# Patient Record
Sex: FEMALE | Race: WHITE | Employment: OTHER | URBAN - METROPOLITAN AREA
[De-identification: names, ages, dates, MRNs, and addresses within clinical notes are randomized per-mention and may not be internally consistent; named-entity substitution may affect disease eponyms.]

---

## 2018-06-24 ENCOUNTER — HOSPITAL ENCOUNTER (EMERGENCY)
Age: 78
Discharge: HOME | End: 2018-06-24
Attending: EMERGENCY MEDICINE
Payer: MEDICARE

## 2018-06-24 VITALS
WEIGHT: 152.34 LBS | OXYGEN SATURATION: 98 % | RESPIRATION RATE: 16 BRPM | HEIGHT: 61 IN | HEART RATE: 62 BPM | DIASTOLIC BLOOD PRESSURE: 76 MMHG | SYSTOLIC BLOOD PRESSURE: 160 MMHG | TEMPERATURE: 98.1 F | BODY MASS INDEX: 28.76 KG/M2

## 2018-06-24 DIAGNOSIS — S81.812A LACERATION OF LEFT LOWER EXTREMITY, INITIAL ENCOUNTER: Primary | ICD-10-CM

## 2018-06-24 PROCEDURE — 90715 TDAP VACCINE 7 YRS/> IM: CPT | Performed by: NURSE PRACTITIONER

## 2018-06-24 PROCEDURE — 74011000250 HC RX REV CODE- 250: Performed by: NURSE PRACTITIONER

## 2018-06-24 PROCEDURE — 74011250636 HC RX REV CODE- 250/636: Performed by: NURSE PRACTITIONER

## 2018-06-24 PROCEDURE — 75810000293 HC SIMP/SUPERF WND  RPR

## 2018-06-24 PROCEDURE — 90471 IMMUNIZATION ADMIN: CPT

## 2018-06-24 PROCEDURE — 77030018836 HC SOL IRR NACL ICUM -A

## 2018-06-24 PROCEDURE — 99282 EMERGENCY DEPT VISIT SF MDM: CPT

## 2018-06-24 PROCEDURE — 77030002986 HC SUT PROL J&J -A

## 2018-06-24 RX ORDER — LIDOCAINE HYDROCHLORIDE 10 MG/ML
10 INJECTION INFILTRATION; PERINEURAL ONCE
Status: COMPLETED | OUTPATIENT
Start: 2018-06-24 | End: 2018-06-24

## 2018-06-24 RX ORDER — LEVOTHYROXINE SODIUM 112 UG/1
112 TABLET ORAL
COMMUNITY

## 2018-06-24 RX ORDER — GUAIFENESIN 100 MG/5ML
81 LIQUID (ML) ORAL DAILY
COMMUNITY
End: 2022-06-08

## 2018-06-24 RX ORDER — CHOLECALCIFEROL (VITAMIN D3) 125 MCG
CAPSULE ORAL
COMMUNITY
End: 2022-06-08

## 2018-06-24 RX ORDER — BACITRACIN 500 UNIT/G
1 PACKET (EA) TOPICAL ONCE
Status: COMPLETED | OUTPATIENT
Start: 2018-06-24 | End: 2018-06-24

## 2018-06-24 RX ADMIN — LIDOCAINE HYDROCHLORIDE 10 ML: 10 INJECTION, SOLUTION INFILTRATION; PERINEURAL at 12:04

## 2018-06-24 RX ADMIN — BACITRACIN 1 PACKET: 500 OINTMENT TOPICAL at 12:04

## 2018-06-24 RX ADMIN — TETANUS TOXOID, REDUCED DIPHTHERIA TOXOID AND ACELLULAR PERTUSSIS VACCINE, ADSORBED 0.5 ML: 5; 2.5; 8; 8; 2.5 SUSPENSION INTRAMUSCULAR at 12:04

## 2018-06-24 NOTE — ED PROVIDER NOTES
HPI Comments: Patient is a 70-year-old female with a past medical history significant for hypercholesterolemia, hypertension, hypothyroid, gastric ulcer who is ambulatory to the ED today with a laceration to her left lateral leg. Patient [place] the [place] putting garbage and knee contain R. when she cut the left lateral side of her lower leg on a wire that was \"sticking up out of the ground\". It began to bleed immediately. Patient states she does bleed easily. Someone from EMS was on scene and was able to apply a pressure dressing. She came to the ED immediately for care of her laceration. Medications and allergies reviewed. Patient has further complaints at this time. Primary care Monika Au MD      The history is provided by the patient. No  was used. Past Medical History:   Diagnosis Date    High cholesterol     Hypertension     Hypothyroid     Stomach ulcer        Past Surgical History:   Procedure Laterality Date    HX HYSTERECTOMY      HX ORTHOPAEDIC           History reviewed. No pertinent family history. Social History     Social History    Marital status: SINGLE     Spouse name: N/A    Number of children: N/A    Years of education: N/A     Occupational History    Not on file. Social History Main Topics    Smoking status: Never Smoker    Smokeless tobacco: Never Used    Alcohol use Yes      Comment: socially    Drug use: No    Sexual activity: Not on file     Other Topics Concern    Not on file     Social History Narrative    No narrative on file         ALLERGIES: Latex and Iodine    Review of Systems   Constitutional: Negative for appetite change, chills and fever. HENT: Negative. Respiratory: Negative for cough, shortness of breath and wheezing. Cardiovascular: Negative for chest pain. Gastrointestinal: Negative for abdominal pain, constipation, diarrhea, nausea and vomiting.    Genitourinary: Negative for dysuria and urgency. Musculoskeletal: Negative for back pain. Skin: Positive for wound. Negative for color change and rash. Neurological: Negative for dizziness and headaches. Psychiatric/Behavioral: Negative. All other systems reviewed and are negative. Vitals:    06/24/18 1152   BP: 190/87   Pulse: 69   Resp: 17   Temp: 98.1 °F (36.7 °C)   SpO2: 98%   Weight: 69.1 kg (152 lb 5.4 oz)   Height: 5' 1\" (1.549 m)            Physical Exam   Constitutional: She appears well-developed and well-nourished. HENT:   Head: Atraumatic. Eyes: EOM are normal.   Neck: No tracheal deviation present. Pulmonary/Chest: Effort normal. No respiratory distress. Musculoskeletal: Normal range of motion. Neurological: She is alert. Skin: Skin is warm and dry. Laceration noted. No rash noted. ~ 3 cm laceration to the left lateral leg. No obvious FB. Minimal depth. Bleeds easily. Photo of closed wound attached at end of note. Psychiatric: She has a normal mood and affect. Her behavior is normal. Judgment and thought content normal.   Nursing note and vitals reviewed. [facility]      ED Course     Assessment & Plan:     Orders Placed This Encounter    lidocaine (XYLOCAINE) 10 mg/mL (1 %) injection 10 mL    bacitracin 500 unit/gram packet 1 Packet    Damari montano(KANE)Felipa Vac-PF (BOOSTRIX) suspension 0.5 mL       Discussed with Celia Parker MD,ED Provider    Aria Palomo NP  06/24/18  12:02 PM      Wound Repair  Date/Time: 6/24/2018 12:30 PM  Performed by: NPPreparation: sterile field established and skin prepped with ChloraPrep  Pre-procedure re-eval: Immediately prior to the procedure, the patient was reevaluated and found suitable for the planned procedure and any planned medications. Time out: Immediately prior to the procedure a time out was called to verify the correct patient, procedure, equipment, staff and marking as appropriate. .  Location details: left leg  Wound length:2.6 - 7.5 cm (~ 3 cm)  Anesthesia: local infiltration    Anesthesia:  Local Anesthetic: lidocaine 1% without epinephrine  Anesthetic total: 8 mL  Foreign bodies: no foreign bodies  Irrigation solution: saline  Irrigation method: tap  Debridement: none  Skin closure: Prolene  Number of sutures: 6  Technique: interrupted  Approximation: close  Dressing: antibiotic ointment and gauze roll (adaptic non-adherent and coban)  Patient tolerance: Patient tolerated the procedure well with no immediate complications  My total time at bedside, performing this procedure was 16-30 minutes. 12:56 PM  The patient has been reevaluated. The patient is ready for discharge. The patient's signs, symptoms, diagnosis, and discharge instructions have been discussed and the patient/ family has conveyed their understanding. The patient is to follow up as recommended or return to the ED should their symptoms worsen. Plan has been discussed and the patient is in agreement. LABORATORY TESTS:  Labs Reviewed - No data to display    IMAGING RESULTS:  No results found. MEDICATIONS GIVEN:  Medications   lidocaine (XYLOCAINE) 10 mg/mL (1 %) injection 10 mL (10 mL IntraDERMal Given by Provider 6/24/18 1204)   bacitracin 500 unit/gram packet 1 Packet (1 Packet Topical Given 6/24/18 1204)   diph,Pertuss(AC),Tet Vac-PF (BOOSTRIX) suspension 0.5 mL (0.5 mL IntraMUSCular Given 6/24/18 1204)       IMPRESSION:  1. Laceration of left lower extremity, initial encounter        PLAN:  1. Current Discharge Medication List      CONTINUE these medications which have NOT CHANGED    Details   levothyroxine (SYNTHROID) 25 mcg tablet Take  by mouth Daily (before breakfast). LOVASTATIN PO Take  by mouth. OTHER       aspirin 81 mg chewable tablet Take 81 mg by mouth daily. naproxen sodium (ALEVE) 220 mg cap Take  by mouth.            2.   Follow-up Information     Follow up With Details Comments Contact Info    Brandyn Sutherland MD  For suture removal in 10-14 days [address] [place]  [facility] EMERGENCY DEPT  As needed, If symptoms worsen [place] [address]  [phone]        3.      Return to ED for new or worsening symptoms       Marie Cabezas NP

## 2018-06-24 NOTE — DISCHARGE INSTRUCTIONS
Cuts: Care Instructions  Your Care Instructions  A cut can happen anywhere on your body. Stitches, staples, skin adhesives, or pieces of tape called Steri-Strips are sometimes used to keep the edges of a cut together and help it heal. Steri-Strips can be used by themselves or with stitches or staples. Sometimes cuts are left open. If the cut went deep and through the skin, the doctor may have closed the cut in two layers. A deeper layer of stitches brings the deep part of the cut together. These stitches will dissolve and don't need to be removed. The upper layer closure, which could be stitches, staples, Steri-Strips, or adhesive, is what you see on the cut. A cut is often covered by a bandage. The doctor has checked you carefully, but problems can develop later. If you notice any problems or new symptoms, get medical treatment right away. Follow-up care is a key part of your treatment and safety. Be sure to make and go to all appointments, and call your doctor if you are having problems. It's also a good idea to know your test results and keep a list of the medicines you take. How can you care for yourself at home? If a cut is open or closed  · Prop up the sore area on a pillow anytime you sit or lie down during the next 3 days. Try to keep it above the level of your heart. This will help reduce swelling. · Keep the cut dry for the first 24 to 48 hours. After this, you can shower if your doctor okays it. Pat the cut dry. · Don't soak the cut, such as in a bathtub. Your doctor will tell you when it's safe to get the cut wet. · After the first 24 to 48 hours, clean the cut with soap and water 2 times a day unless your doctor gives you different instructions. ¨ Don't use hydrogen peroxide or alcohol, which can slow healing. ¨ You may cover the cut with a thin layer of petroleum jelly and a nonstick bandage.   ¨ If the doctor put a bandage over the cut, put on a new bandage after cleaning the cut or if the bandage gets wet or dirty. · Avoid any activity that could cause your cut to reopen. · Be safe with medicines. Read and follow all instructions on the label. ¨ If the doctor gave you a prescription medicine for pain, take it as prescribed. ¨ If you are not taking a prescription pain medicine, ask your doctor if you can take an over-the-counter medicine. If the cut is closed with stitches, staples, or Steri-Strips  · Follow the above instructions for open or closed cuts. · Do not remove the stitches or staples on your own. Your doctor will tell you when to come back to have the stitches or staples removed. · Leave Steri-Strips on until they fall off. If the cut is closed with a skin adhesive  · Follow the above instructions for open or closed cuts. · Leave the skin adhesive on your skin until it falls off on its own. This may take 5 to 10 days. · Do not scratch, rub, or pick at the adhesive. · Do not put the sticky part of a bandage directly on the adhesive. · Do not put any kind of ointment, cream, or lotion over the area. This can make the adhesive fall off too soon. Do not use hydrogen peroxide or alcohol, which can slow healing. When should you call for help? Call your doctor now or seek immediate medical care if:  ? · You have new pain, or your pain gets worse. ? · The skin near the cut is cold or pale or changes color. ? · You have tingling, weakness, or numbness near the cut.   ? · The cut starts to bleed, and blood soaks through the bandage. Oozing small amounts of blood is normal.   ? · You have trouble moving the area near the cut.   ? · You have symptoms of infection, such as:  ¨ Increased pain, swelling, warmth, or redness around the cut. ¨ Red streaks leading from the cut. ¨ Pus draining from the cut. ¨ A fever. ? Watch closely for changes in your health, and be sure to contact your doctor if:  ? · The cut reopens. ? · You do not get better as expected.    Where can you learn more? Go to http://ravi-adrien.info/. Enter M735 in the search box to learn more about \"Cuts: Care Instructions. \"  Current as of: March 20, 2017  Content Version: 11.4  © 6927-7043 [employer]. Care instructions adapted under license by [employer] (which disclaims liability or warranty for this information). If you have questions about a medical condition or this instruction, always ask your healthcare professional. [address] any warranty or liability for your use of this information.

## 2018-06-24 NOTE — ED TRIAGE NOTES
Patient ambulatory to ED treatment area with steady gait for complaint of \"[place] and I ended up cutting my left leg on a piece of metal sticking out of the ground. \" Event occurred around 9:30am today. Denies taking blood thinning medications. Reports EMS wrapped the area. Last tetanus shot was 7 yrs ago. Patient is alert and oriented. Answering questions appropriately.

## 2018-06-24 NOTE — ED NOTES
Patient discharged home after receiving discharge instructions from MD/NP. Patient voiced understanding and doesn't have any questions at this time. Patient in no distress at this time.  Pt ambulated out of the ER with dressing intact

## 2020-10-12 ENCOUNTER — HOSPITAL ENCOUNTER (EMERGENCY)
Age: 80
Discharge: HOME | End: 2020-10-12
Attending: EMERGENCY MEDICINE
Payer: MEDICARE

## 2020-10-12 VITALS
OXYGEN SATURATION: 97 % | HEART RATE: 82 BPM | TEMPERATURE: 98.2 F | HEIGHT: 62 IN | SYSTOLIC BLOOD PRESSURE: 141 MMHG | WEIGHT: 138 LBS | RESPIRATION RATE: 16 BRPM | BODY MASS INDEX: 25.4 KG/M2 | DIASTOLIC BLOOD PRESSURE: 74 MMHG

## 2020-10-12 DIAGNOSIS — N30.01 ACUTE CYSTITIS WITH HEMATURIA: Primary | ICD-10-CM

## 2020-10-12 LAB
APPEARANCE UR: ABNORMAL
BACTERIA URNS QL MICRO: ABNORMAL /HPF
BILIRUB UR QL: NEGATIVE
COLOR UR: ABNORMAL
EPITH CASTS URNS QL MICRO: ABNORMAL /LPF
GLUCOSE UR STRIP.AUTO-MCNC: NEGATIVE MG/DL
HGB UR QL STRIP: ABNORMAL
KETONES UR QL STRIP.AUTO: NEGATIVE MG/DL
LEUKOCYTE ESTERASE UR QL STRIP.AUTO: ABNORMAL
NITRITE UR QL STRIP.AUTO: NEGATIVE
PH UR STRIP: 6 [PH] (ref 5–8)
PROT UR STRIP-MCNC: 100 MG/DL
RBC #/AREA URNS HPF: ABNORMAL /HPF (ref 0–5)
SP GR UR REFRACTOMETRY: 1.01 (ref 1–1.03)
UR CULT HOLD, URHOLD: NORMAL
UROBILINOGEN UR QL STRIP.AUTO: 0.2 EU/DL (ref 0.2–1)
WBC URNS QL MICRO: >100 /HPF (ref 0–4)

## 2020-10-12 PROCEDURE — 81001 URINALYSIS AUTO W/SCOPE: CPT

## 2020-10-12 PROCEDURE — 99284 EMERGENCY DEPT VISIT MOD MDM: CPT

## 2020-10-12 RX ORDER — FLUCONAZOLE 150 MG/1
150 TABLET ORAL DAILY
Qty: 1 TAB | Refills: 0 | Status: SHIPPED | OUTPATIENT
Start: 2020-10-12 | End: 2020-10-13

## 2020-10-12 RX ORDER — CEPHALEXIN 500 MG/1
500 CAPSULE ORAL 3 TIMES DAILY
Qty: 10 CAP | Refills: 0 | Status: SHIPPED | OUTPATIENT
Start: 2020-10-12 | End: 2022-06-08

## 2020-10-12 RX ORDER — FLUCONAZOLE 100 MG/1
150 TABLET ORAL
Status: DISCONTINUED | OUTPATIENT
Start: 2020-10-12 | End: 2020-10-12 | Stop reason: HOSPADM

## 2020-10-12 RX ORDER — CEPHALEXIN 250 MG/1
500 CAPSULE ORAL
Status: DISCONTINUED | OUTPATIENT
Start: 2020-10-12 | End: 2020-10-12 | Stop reason: HOSPADM

## 2020-10-12 NOTE — ED PROVIDER NOTES
65-year-old female with a history of high cholesterol, hypertension, and hypothyroidism has felt urinary urgency and pressure with dysuria for the last 3 or 4 days. She tried Azo and cranberry juice with no relief. No fever or nausea or vomiting or back pain. She thinks she has a urinary tract infection, but she has never had one. Past Medical History:   Diagnosis Date    High cholesterol     Hypertension     Hypothyroid     Stomach ulcer        Past Surgical History:   Procedure Laterality Date    HX HYSTERECTOMY      HX ORTHOPAEDIC      right ankle         History reviewed. No pertinent family history.     Social History     Socioeconomic History    Marital status:      Spouse name: Not on file    Number of children: Not on file    Years of education: Not on file    Highest education level: Not on file   Occupational History    Not on file   Social Needs    Financial resource strain: Not on file    Food insecurity     Worry: Not on file     Inability: Not on file    Transportation needs     Medical: Not on file     Non-medical: Not on file   Tobacco Use    Smoking status: Never Smoker    Smokeless tobacco: Never Used   Substance and Sexual Activity    Alcohol use: Yes     Comment: socially    Drug use: No    Sexual activity: Not on file   Lifestyle    Physical activity     Days per week: Not on file     Minutes per session: Not on file    Stress: Not on file   Relationships    Social connections     Talks on phone: Not on file     Gets together: Not on file     Attends Mosque service: Not on file     Active member of club or organization: Not on file     Attends meetings of clubs or organizations: Not on file     Relationship status: Not on file    Intimate partner violence     Fear of current or ex partner: Not on file     Emotionally abused: Not on file     Physically abused: Not on file     Forced sexual activity: Not on file   Other Topics Concern    Not on file Social History Narrative    Not on file         ALLERGIES: Latex and Iodine    Review of Systems   Constitutional: Negative for fever. HENT: Negative for trouble swallowing. Eyes: Negative for visual disturbance. Respiratory: Negative for cough. Cardiovascular: Negative for chest pain. Gastrointestinal: Negative for abdominal pain. Genitourinary: Positive for difficulty urinating, dysuria and urgency. Musculoskeletal: Negative for gait problem. Skin: Negative for rash. Neurological: Negative for headaches. Hematological: Does not bruise/bleed easily. Psychiatric/Behavioral: Negative for sleep disturbance. Vitals:    10/12/20 1526   BP: (!) 158/65   Pulse: 82   Resp: 16   Temp: 98.2 °F (36.8 °C)   SpO2: 97%   Weight: 62.6 kg (138 lb)   Height: 5' 2\" (1.575 m)            Physical Exam  Constitutional:       Appearance: Normal appearance. HENT:      Head: Normocephalic. Nose: Nose normal.      Mouth/Throat:      Mouth: Mucous membranes are moist.   Eyes:      Extraocular Movements: Extraocular movements intact. Conjunctiva/sclera: Conjunctivae normal.   Cardiovascular:      Rate and Rhythm: Normal rate. Pulmonary:      Effort: Pulmonary effort is normal. No respiratory distress. Abdominal:      General: Abdomen is flat. Palpations: Abdomen is soft. Tenderness: There is no abdominal tenderness. Musculoskeletal: Normal range of motion. Skin:     Findings: No rash. Neurological:      General: No focal deficit present. Mental Status: She is alert.    Psychiatric:         Behavior: Behavior normal.          MDM  Number of Diagnoses or Management Options  Acute cystitis with hematuria:   Diagnosis management comments: Cystitis symptoms and urine with significant pyuria is almost certainly acute cystitis  Plan to treat with Keflex and to give some fluconazole to prevent a yeast infection  Follow-up with her doctor unless she feels worse, then she could come back to the emergency department.          Procedures

## 2020-10-12 NOTE — ED NOTES
Patient left prior to receiving medication here prior to discharge, but has prescriptions for at home. MD aware. No further action needed. The patient was discharged home by provider in stable condition. The patient is alert and oriented, in no respiratory distress and discharge vital signs obtained. The patient's diagnosis, condition and treatment were explained. The patient expressed understanding. Two prescriptions given. No work/school note given. A discharge plan has been developed. A  was not involved in the process. Aftercare instructions were given. Pt ambulatory out of the ED.

## 2020-10-12 NOTE — ED TRIAGE NOTES
Patient presents ambulatory to treatment area with a steady gait. Patient complains of urinary urgency, frequency, and pain that began Saturday. Patient has been taking Azo with minimal relief. Denies fever.

## 2020-10-12 NOTE — DISCHARGE INSTRUCTIONS

## 2022-06-08 ENCOUNTER — TRANSCRIBE ORDER (OUTPATIENT)
Dept: SCHEDULING | Age: 82
End: 2022-06-08

## 2022-06-08 ENCOUNTER — HOSPITAL ENCOUNTER (EMERGENCY)
Age: 82
Discharge: HOME | End: 2022-06-08
Attending: STUDENT IN AN ORGANIZED HEALTH CARE EDUCATION/TRAINING PROGRAM
Payer: MEDICARE

## 2022-06-08 VITALS
BODY MASS INDEX: 28.18 KG/M2 | HEIGHT: 61 IN | DIASTOLIC BLOOD PRESSURE: 76 MMHG | OXYGEN SATURATION: 96 % | SYSTOLIC BLOOD PRESSURE: 139 MMHG | RESPIRATION RATE: 18 BRPM | TEMPERATURE: 97 F | HEART RATE: 90 BPM | WEIGHT: 149.25 LBS

## 2022-06-08 DIAGNOSIS — M54.17 LUMBOSACRAL RADICULOPATHY: Primary | ICD-10-CM

## 2022-06-08 DIAGNOSIS — M54.32 LEFT SIDED SCIATICA: Primary | ICD-10-CM

## 2022-06-08 PROCEDURE — 74011250637 HC RX REV CODE- 250/637: Performed by: STUDENT IN AN ORGANIZED HEALTH CARE EDUCATION/TRAINING PROGRAM

## 2022-06-08 PROCEDURE — 99283 EMERGENCY DEPT VISIT LOW MDM: CPT

## 2022-06-08 RX ORDER — OXYCODONE AND ACETAMINOPHEN 5; 325 MG/1; MG/1
1 TABLET ORAL
Qty: 20 TABLET | Refills: 0 | Status: SHIPPED | OUTPATIENT
Start: 2022-06-08 | End: 2022-06-13

## 2022-06-08 RX ORDER — TRAMADOL HYDROCHLORIDE 50 MG/1
50 TABLET ORAL
COMMUNITY
End: 2022-06-08 | Stop reason: ALTCHOICE

## 2022-06-08 RX ORDER — SERTRALINE HYDROCHLORIDE 100 MG/1
100 TABLET, FILM COATED ORAL EVERY EVENING
COMMUNITY

## 2022-06-08 RX ORDER — OXYCODONE AND ACETAMINOPHEN 5; 325 MG/1; MG/1
1 TABLET ORAL ONCE
Status: COMPLETED | OUTPATIENT
Start: 2022-06-08 | End: 2022-06-08

## 2022-06-08 RX ADMIN — OXYCODONE HYDROCHLORIDE AND ACETAMINOPHEN 1 TABLET: 5; 325 TABLET ORAL at 11:44

## 2022-06-08 NOTE — ED NOTES
Pt was discharged and given instructions by Dr Elbert North . Pt verbalized good understanding of all discharge instructions,prescriptions and F/U care. All questions answered. Pt in stable condition on discharge. Pt accompanied home by her niece.

## 2022-06-08 NOTE — ED PROVIDER NOTES
Soumya Dai is a [de-identified] y.o. female with past medical history notable for high cholesterol, hypertension, hypothyroidism, stomach ulcer in the past, presenting with back pain. She states that she has had gradually progressive left buttock and low back pain radiating down her leg over the past several months, gradually worsening. She states that it feels like the leg is giving out at times during paroxysms of severe pain. She has not had fevers chills. Did not have any preceding injury. Denies dysuria. States that she was seen by her primary care doctor last week and she was given a steroid and tramadol. The steroid seems not to be helping very much. Tramadol is only moderately effective. She has not had spinal surgery or injection in the past.  She states that her primary care physician has not been contactable over the last 9 days and she is concerned that she was supposed to be scheduled for an appointment with a specialist but has not heard back. She tried a muscle relaxant without much improvement. She has continued to be ambulatory. Past Medical History:   Diagnosis Date    High cholesterol     Hypertension     Hypothyroid     Stomach ulcer        Past Surgical History:   Procedure Laterality Date    HX HYSTERECTOMY      HX ORTHOPAEDIC      right ankle         History reviewed. No pertinent family history.     Social History     Socioeconomic History    Marital status:      Spouse name: Not on file    Number of children: Not on file    Years of education: Not on file    Highest education level: Not on file   Occupational History    Not on file   Tobacco Use    Smoking status: Never Smoker    Smokeless tobacco: Never Used   Substance and Sexual Activity    Alcohol use: Yes     Comment: socially    Drug use: No    Sexual activity: Not on file   Other Topics Concern    Not on file   Social History Narrative    Not on file     Social Determinants of Health     Financial Resource Strain:     Difficulty of Paying Living Expenses: Not on file   Food Insecurity:     Worried About Running Out of Food in the Last Year: Not on file    Kim of Food in the Last Year: Not on file   Transportation Needs:     Lack of Transportation (Medical): Not on file    Lack of Transportation (Non-Medical): Not on file   Physical Activity:     Days of Exercise per Week: Not on file    Minutes of Exercise per Session: Not on file   Stress:     Feeling of Stress : Not on file   Social Connections:     Frequency of Communication with Friends and Family: Not on file    Frequency of Social Gatherings with Friends and Family: Not on file    Attends Roman Catholic Services: Not on file    Active Member of [address] [place] [address] or Organizations: Not on file    Attends Club or Organization Meetings: Not on file    Marital Status: Not on file   Intimate Partner Violence:     Fear of Current or Ex-Partner: Not on file    Emotionally Abused: Not on file    Physically Abused: Not on file    Sexually Abused: Not on file   Housing Stability:     Unable to Pay for Housing in the Last Year: Not on file    Number of [place] in the Last Year: Not on file    Unstable Housing in the Last Year: Not on file         ALLERGIES: Latex and Iodine    Review of Systems   Constitutional: Negative for chills and fever. Cardiovascular: Negative for chest pain. Gastrointestinal: Negative for abdominal pain, nausea and vomiting. Genitourinary: Negative for dysuria. Musculoskeletal: Positive for back pain. Neurological: Negative for light-headedness. All other systems reviewed and are negative. Vitals:    06/08/22 1102   BP: (!) 160/84   Pulse: 90   Resp: 18   Temp: 97 °F (36.1 °C)   SpO2: 97%   Weight: 67.7 kg (149 lb 4 oz)   Height: 5' 1\" (1.549 m)            Physical Exam  Constitutional:       General: She is not in acute distress. Appearance: She is not toxic-appearing.    HENT:      Head: Normocephalic and atraumatic. Mouth/Throat:      Mouth: Mucous membranes are moist.   Eyes:      Extraocular Movements: Extraocular movements intact. Cardiovascular:      Rate and Rhythm: Normal rate and regular rhythm. Heart sounds: Normal heart sounds. Pulmonary:      Effort: Pulmonary effort is normal. No respiratory distress. Breath sounds: Normal breath sounds. Abdominal:      Palpations: Abdomen is soft. Tenderness: There is no abdominal tenderness. Musculoskeletal:      Cervical back: Normal range of motion. Right lower leg: No edema. Left lower leg: No edema. Comments:   5/5 flexion/extension  hips bilateral  5/5 knee flexion/extension bilateral   5/5 foot flexion/extension bilateral   5/5 EHL/FHL bilateral   Straight leg raise positive left  No saddle anesthesia present. Skin:     Capillary Refill: Capillary refill takes less than 2 seconds. Neurological:      General: No focal deficit present. Mental Status: She is alert and oriented to person, place, and time. Psychiatric:         Mood and Affect: Mood normal.          [facility]  ED Course as of 06/08/22 1153   Wed Jun 08, 2022   1122 I attempted twice to call her primary care physician, no response after waiting for several minutes. [NS]   1489 D/w Dr. Kelley Gale -- XR 9mm anterolisthesis, degerative changes in the hip. [NS]      ED Course User Index  [NS] Tanja Barajas MD       Procedures      MEDICAL DECISION MAKING:  [de-identified] y.o. female presents with Back Pain and Leg Pain    Differential diagnosis includes but not limited to: Lumbar radiculopathy, spinal stenosis. Less likely spinal epidural compression from an infection or hematoma. She is not on anticoagulants. She has not had any trauma. She is already had an x-ray but is just unaware of the result. I attempted to contact her primary care and was also unsuccessful. We can try more potent pain medication.   Will refer to Dr. Wendi Clements TESTS:  Labs Reviewed - No data to display    IMAGING RESULTS:  No orders to display       MEDICATIONS GIVEN:  Medications   oxyCODONE-acetaminophen (PERCOCET) 5-325 mg per tablet 1 Tablet (1 Tablet Oral Given 6/8/22 1144)       PROGRESS NOTE: \11:52 AM Patient has remained stable and is ready for discharge    EKG:  none completed    CONSULTS:  PCP:  11:53 AM we will schedule an MRI, patient needs to make a Ortho follow-up. Give another referral as well. Discussed with family at bedside    IMPRESSION:  1. Lumbosacral radiculopathy        PLAN:  -   Discharge  Current Discharge Medication List      START taking these medications    Details   oxyCODONE-acetaminophen (Percocet) 5-325 mg per tablet Take 1 Tablet by mouth every six (6) hours as needed for Pain for up to 5 days. Max Daily Amount: 4 Tablets. Qty: 20 Tablet, Refills: 0  Start date: 6/8/2022, End date: 6/13/2022    Associated Diagnoses: Lumbosacral radiculopathy           Follow-up Information     Follow up With Specialties Details Why Contact Info    Argentina Mann MD Orthopedic Surgery Schedule an appointment as soon as possible for a visit  Call for a follow up appointment. [address]      Pedro Ca MD Orthopedic Surgery Schedule an appointment as soon as possible for a visit  Call for a follow up appointment. [address] [facility] [address] [phone]          Return precautions given      Karthik Nugent MD      Please note that this dictation was completed with amiando, the computer voice recognition software. Quite often unanticipated grammatical, syntax, homophones, and other interpretive errors are inadvertently transcribed by the computer software. Please disregard these errors. Please excuse any errors that have escaped final proofreading.

## 2022-06-08 NOTE — ED TRIAGE NOTES
Pt ambulated to the treatment area with a slight limp accompanied by her neice. Pt states \"about a month ago I started having left lower back pain that radiates all the way to my foot. I have been to my doctor she did an xray 8 days ago and was supposed to set me up for an MRI but I have not received results and they have not set up the MRI. My left leg and back have extreme pain all the time. My left gave out on me a few times I cant lift my leg to get up steps I cant sleep because of the pain I have Tramadol and just had a steroid pack and it has not helped. \" Dr Rigoberto Hoyos in room.

## 2022-06-17 ENCOUNTER — HOSPITAL ENCOUNTER (OUTPATIENT)
Dept: MRI IMAGING | Age: 82
Discharge: HOME | End: 2022-06-17
Attending: FAMILY MEDICINE
Payer: MEDICARE

## 2022-06-17 DIAGNOSIS — M54.32 LEFT SIDED SCIATICA: ICD-10-CM

## 2022-06-17 PROCEDURE — 72148 MRI LUMBAR SPINE W/O DYE: CPT

## 2022-06-24 ENCOUNTER — TRANSCRIBE ORDER (OUTPATIENT)
Dept: SCHEDULING | Age: 82
End: 2022-06-24

## 2022-06-24 DIAGNOSIS — N28.9 RENAL LESION: Primary | ICD-10-CM

## 2022-06-27 ENCOUNTER — HOSPITAL ENCOUNTER (OUTPATIENT)
Dept: CT IMAGING | Age: 82
Discharge: HOME | End: 2022-06-27
Attending: ORTHOPAEDIC SURGERY

## 2022-06-27 DIAGNOSIS — N28.9 RENAL LESION: ICD-10-CM

## 2022-06-30 ENCOUNTER — TRANSCRIBE ORDER (OUTPATIENT)
Dept: SCHEDULING | Age: 82
End: 2022-06-30

## 2022-06-30 DIAGNOSIS — N28.9 KIDNEY LESION: Primary | ICD-10-CM

## 2022-07-01 ENCOUNTER — HOSPITAL ENCOUNTER (OUTPATIENT)
Dept: MRI IMAGING | Age: 82
Discharge: HOME | End: 2022-07-01
Attending: ORTHOPAEDIC SURGERY
Payer: MEDICARE

## 2022-07-01 VITALS — BODY MASS INDEX: 27.21 KG/M2 | WEIGHT: 144 LBS

## 2022-07-01 DIAGNOSIS — N28.9 KIDNEY LESION: ICD-10-CM

## 2022-07-01 PROCEDURE — 77030021566

## 2022-07-01 PROCEDURE — 74011250636 HC RX REV CODE- 250/636: Performed by: ORTHOPAEDIC SURGERY

## 2022-07-01 PROCEDURE — A9575 INJ GADOTERATE MEGLUMI 0.1ML: HCPCS | Performed by: ORTHOPAEDIC SURGERY

## 2022-07-01 PROCEDURE — 74183 MRI ABD W/O CNTR FLWD CNTR: CPT

## 2022-07-01 RX ORDER — GADOTERATE MEGLUMINE 376.9 MG/ML
13 INJECTION INTRAVENOUS
Status: COMPLETED | OUTPATIENT
Start: 2022-07-01 | End: 2022-07-01

## 2022-07-01 RX ADMIN — GADOTERATE MEGLUMINE 13 ML: 376.9 INJECTION INTRAVENOUS at 12:35

## 2022-07-14 ENCOUNTER — HOSPITAL ENCOUNTER (OUTPATIENT)
Dept: PREADMISSION TESTING | Age: 82
Discharge: HOME | End: 2022-07-14
Payer: MEDICARE

## 2022-07-14 VITALS
SYSTOLIC BLOOD PRESSURE: 165 MMHG | DIASTOLIC BLOOD PRESSURE: 90 MMHG | OXYGEN SATURATION: 97 % | BODY MASS INDEX: 28.12 KG/M2 | HEART RATE: 61 BPM | RESPIRATION RATE: 16 BRPM | TEMPERATURE: 97 F | WEIGHT: 148.8 LBS

## 2022-07-14 LAB
25(OH)D3 SERPL-MCNC: 53.5 NG/ML (ref 30–100)
ABO + RH BLD: NORMAL
ALBUMIN SERPL-MCNC: 4.2 G/DL (ref 3.5–5)
ALBUMIN/GLOB SERPL: 1.2 {RATIO} (ref 1.1–2.2)
ALP SERPL-CCNC: 120 U/L (ref 45–117)
ALT SERPL-CCNC: 31 U/L (ref 12–78)
ANION GAP SERPL CALC-SCNC: 8 MMOL/L (ref 5–15)
APPEARANCE UR: CLEAR
APTT PPP: 28.9 SEC (ref 22.1–31)
AST SERPL-CCNC: 27 U/L (ref 15–37)
ATRIAL RATE: 80 BPM
BACTERIA URNS QL MICRO: NEGATIVE /HPF
BASOPHILS # BLD: 0.1 K/UL (ref 0–0.1)
BASOPHILS NFR BLD: 1 % (ref 0–1)
BILIRUB SERPL-MCNC: 0.6 MG/DL (ref 0.2–1)
BILIRUB UR QL: NEGATIVE
BLOOD GROUP ANTIBODIES SERPL: NORMAL
BUN SERPL-MCNC: 14 MG/DL (ref 6–20)
BUN/CREAT SERPL: 18 (ref 12–20)
CALCIUM SERPL-MCNC: 9.7 MG/DL (ref 8.5–10.1)
CALCULATED P AXIS, ECG09: 9 DEGREES
CALCULATED R AXIS, ECG10: 26 DEGREES
CALCULATED T AXIS, ECG11: 46 DEGREES
CHLORIDE SERPL-SCNC: 97 MMOL/L (ref 97–108)
CO2 SERPL-SCNC: 29 MMOL/L (ref 21–32)
COLOR UR: ABNORMAL
CREAT SERPL-MCNC: 0.76 MG/DL (ref 0.55–1.02)
DIAGNOSIS, 93000: NORMAL
DIFFERENTIAL METHOD BLD: NORMAL
EOSINOPHIL # BLD: 0.1 K/UL (ref 0–0.4)
EOSINOPHIL NFR BLD: 2 % (ref 0–7)
EPITH CASTS URNS QL MICRO: ABNORMAL /LPF
ERYTHROCYTE [DISTWIDTH] IN BLOOD BY AUTOMATED COUNT: 13.1 % (ref 11.5–14.5)
EST. AVERAGE GLUCOSE BLD GHB EST-MCNC: 128 MG/DL
GLOBULIN SER CALC-MCNC: 3.5 G/DL (ref 2–4)
GLUCOSE SERPL-MCNC: 92 MG/DL (ref 65–100)
GLUCOSE UR STRIP.AUTO-MCNC: NEGATIVE MG/DL
HBA1C MFR BLD: 6.1 % (ref 4–5.6)
HCT VFR BLD AUTO: 38.6 % (ref 35–47)
HGB BLD-MCNC: 12.8 G/DL (ref 11.5–16)
HGB UR QL STRIP: ABNORMAL
HYALINE CASTS URNS QL MICRO: ABNORMAL /LPF (ref 0–2)
IMM GRANULOCYTES # BLD AUTO: 0 K/UL (ref 0–0.04)
IMM GRANULOCYTES NFR BLD AUTO: 0 % (ref 0–0.5)
INR PPP: 1 (ref 0.9–1.1)
KETONES UR QL STRIP.AUTO: NEGATIVE MG/DL
LEUKOCYTE ESTERASE UR QL STRIP.AUTO: NEGATIVE
LYMPHOCYTES # BLD: 1.4 K/UL (ref 0.8–3.5)
LYMPHOCYTES NFR BLD: 22 % (ref 12–49)
MCH RBC QN AUTO: 28.7 PG (ref 26–34)
MCHC RBC AUTO-ENTMCNC: 33.2 G/DL (ref 30–36.5)
MCV RBC AUTO: 86.5 FL (ref 80–99)
MONOCYTES # BLD: 0.5 K/UL (ref 0–1)
MONOCYTES NFR BLD: 8 % (ref 5–13)
NEUTS SEG # BLD: 4.3 K/UL (ref 1.8–8)
NEUTS SEG NFR BLD: 67 % (ref 32–75)
NITRITE UR QL STRIP.AUTO: NEGATIVE
NRBC # BLD: 0 K/UL (ref 0–0.01)
NRBC BLD-RTO: 0 PER 100 WBC
P-R INTERVAL, ECG05: 146 MS
PH UR STRIP: 6.5 [PH] (ref 5–8)
PLATELET # BLD AUTO: 338 K/UL (ref 150–400)
PMV BLD AUTO: 9.7 FL (ref 8.9–12.9)
POTASSIUM SERPL-SCNC: 3.9 MMOL/L (ref 3.5–5.1)
PROT SERPL-MCNC: 7.7 G/DL (ref 6.4–8.2)
PROT UR STRIP-MCNC: NEGATIVE MG/DL
PROTHROMBIN TIME: 10.3 SEC (ref 9–11.1)
Q-T INTERVAL, ECG07: 408 MS
QRS DURATION, ECG06: 90 MS
QTC CALCULATION (BEZET), ECG08: 470 MS
RBC # BLD AUTO: 4.46 M/UL (ref 3.8–5.2)
RBC #/AREA URNS HPF: ABNORMAL /HPF (ref 0–5)
SODIUM SERPL-SCNC: 134 MMOL/L (ref 136–145)
SP GR UR REFRACTOMETRY: 1.01 (ref 1–1.03)
SPECIMEN EXP DATE BLD: NORMAL
THERAPEUTIC RANGE,PTTT: NORMAL SECS (ref 58–77)
UA: UC IF INDICATED,UAUC: ABNORMAL
UROBILINOGEN UR QL STRIP.AUTO: 0.2 EU/DL (ref 0.2–1)
VENTRICULAR RATE, ECG03: 80 BPM
WBC # BLD AUTO: 6.4 K/UL (ref 3.6–11)
WBC URNS QL MICRO: ABNORMAL /HPF (ref 0–4)

## 2022-07-14 PROCEDURE — 87077 CULTURE AEROBIC IDENTIFY: CPT

## 2022-07-14 PROCEDURE — 81001 URINALYSIS AUTO W/SCOPE: CPT

## 2022-07-14 PROCEDURE — 85730 THROMBOPLASTIN TIME PARTIAL: CPT

## 2022-07-14 PROCEDURE — 83036 HEMOGLOBIN GLYCOSYLATED A1C: CPT

## 2022-07-14 PROCEDURE — 80053 COMPREHEN METABOLIC PANEL: CPT

## 2022-07-14 PROCEDURE — 82306 VITAMIN D 25 HYDROXY: CPT

## 2022-07-14 PROCEDURE — 93005 ELECTROCARDIOGRAM TRACING: CPT

## 2022-07-14 PROCEDURE — 86900 BLOOD TYPING SEROLOGIC ABO: CPT

## 2022-07-14 PROCEDURE — 85025 COMPLETE CBC W/AUTO DIFF WBC: CPT

## 2022-07-14 PROCEDURE — 85610 PROTHROMBIN TIME: CPT

## 2022-07-14 PROCEDURE — 36415 COLL VENOUS BLD VENIPUNCTURE: CPT

## 2022-07-14 RX ORDER — TRAMADOL HYDROCHLORIDE 50 MG/1
50 TABLET ORAL
COMMUNITY
End: 2022-07-28

## 2022-07-14 RX ORDER — ROSUVASTATIN CALCIUM 20 MG/1
20 TABLET, COATED ORAL
COMMUNITY

## 2022-07-14 NOTE — PERIOP NOTES
[address] [facility] [address], [address] [facility]   MAIN OR                                  [phone]   MAIN PRE OP                          [phone]                                                                                AMBULATORY PRE OP          [phone]  PRE-ADMISSION TESTING    [phone]     Surgery Date:  7/25 Monday        Is surgery arrival time given by surgeon? NO  If NO, [address] [facility] staff will call you between 3 and 7pm the day before your surgery with your arrival time. (If your surgery is on a Monday, we will call you the Friday before.)    Call [phone] after 7pm Monday-Friday if you did not receive this call. INSTRUCTIONS BEFORE YOUR SURGERY   When You  Arrive Arrive at the 2nd [address] [facility] on the day of your surgery  Have your insurance card, photo ID, and any copayment (if needed)   Food   and   Drink NO food or drink after midnight the night before surgery    This means NO water, gum, mints, coffee, juice, etc.  No alcohol (beer, wine, liquor) 24 hours before and after surgery   Medications to   TAKE   Morning of Surgery MEDICATIONS TO TAKE THE MORNING OF SURGERY WITH A SIP OF WATER:    Levothyroxine     Please do not take your lisinopril the night before surgery. Medications  To  STOP      7 days before surgery  Non-Steroidal anti-inflammatory Drugs (NSAID's): for example, Ibuprofen (Advil, Motrin), Naproxen (Aleve)   Aspirin, if taking for pain    Herbal supplements, vitamins, and fish oil   Other: Centrum Silver and Tumeric   (Pain medications not listed above, including Tylenol may be taken)   Blood  Thinners  If you take  Aspirin, Plavix, Coumadin, or any blood-thinning or anti-blood clot medicine, talk to the doctor who prescribed the medications for pre-operative instructions.    Bathing Clothing  Jewelry  Valuables      If you shower the morning of surgery, please do not apply anything to your skin (lotions, powders, deodorant, or makeup, especially mascara)   Follow Chlorhexidine Care Fusion body wash instructions provided to you during PAT appointment. Begin 3 days prior to surgery.  Do not shave or trim anywhere 24 hours before surgery   Wear your hair loose or down; no pony-tails, buns, or metal hair clips   Wear loose, comfortable, clean clothes   Wear glasses instead of contacts  [employer] money, valuables, and jewelry, including body piercings, at home   If you were given an BrandCont, bring it on day of surgery. Going Home - or Spending the Night  SAME-DAY SURGERY: You must have a responsible adult drive you home and stay with you 24 hours after surgery   ADMITS: If your doctor is keeping you in the hospital after surgery, leave personal belongings/luggage in your car until you have a hospital room number. Hospital discharge time is 12 noon  Drivers must be here before 12 noon unless you are told differently   Special Instructions        Follow all instructions so your surgery wont be cancelled. Please, be on time. If a situation occurs and you are delayed the day of surgery, call [phone]    If your physical condition changes (like a fever, cold, flu, etc.) call your surgeon. Home medication(s) reviewed and verified verbally with list during PAT appointment. The patient was contacted in person. The patient verbalizes understanding of all instructions and does not need reinforcement.

## 2022-07-14 NOTE — H&P
History and Physical    Patient: Soren Pierson MRN: [id number]  SSN: [id number]    YOB: 1940  Age: [de-identified] y.o. Sex: female      CC: Low back pain    Subjective:      Soren Pierson is a [de-identified] y.o. female referred for pre-operative evaluation by Dr. Fabio Daley for surgery on 7/25/22. Chente [place] notes that she has had sciatica pain for a long time but the last three months her pain has become unbearable and she cannot walk. She is taking Tramadol but notes her pain is still 8/10. Pain is from the low back down to her foot on the left. Nothing makes her pain better. Sitting increases her pain. She does ok walking but only for a short time. She had numbness but the epidural injection helped. Denies falls. She notes she has never been tested for a genetic clotting disorder but when she had her cataracts removed she had to take Vitamin K preop. She notes she is an easy bleeder. The patient was evaluated in the surgeon's office and it was determined that the most appropriate plan of care is to proceed with surgical intervention. Patient's PCP Sammi Morelos MD    Past Medical History:   Diagnosis Date    Anxiety     Bleeds easily [facility])     During Cataract sx had to have Vitamin K- never tested for genetic clotting disorder    High cholesterol     Hypertension     Hypothyroid     White coat syndrome with hypertension      Past Surgical History:   Procedure Laterality Date    HX ANKLE FRACTURE TX Right     HX CATARACT REMOVAL Bilateral     HX HYSTERECTOMY        No family history on file. Social History     Tobacco Use    Smoking status: Never Smoker    Smokeless tobacco: Never Used   Substance Use Topics    Alcohol use: Yes     Comment: socially    Drug use: No      Prior to Admission medications    Medication Sig Start Date End Date Taking? Authorizing Provider   traMADoL (ULTRAM) 50 mg tablet Take 50 mg by mouth every six (6) hours as needed for Pain.    Yes Provider, Historical   rosuvastatin (CRESTOR) 20 mg tablet Take 20 mg by mouth nightly. Yes Provider, Historical   acetaminophen (TYLENOL 8 HOUR PO) Take 2 Tablets by mouth as needed. Yes Provider, Historical   multivitamin, tx-iron-ca-min (THERA-M w/ IRON) 9 mg iron-400 mcg tab tablet Take 1 Tablet by mouth every evening. Yes Provider, Historical   TUMERIC-GING-OLIVE-OREG-CAPRYL PO Take 1,000 mg by mouth every evening. Yes Provider, Historical   LISINOPRIL PO Take 25 mg by mouth every evening. Yes Other, MD Sharmila   sertraline (Zoloft) 100 mg tablet Take 100 mg by mouth every evening. Yes Other, MD Sharmila   levothyroxine (SYNTHROID) 112 mcg tablet Take 112 mcg by mouth Daily (before breakfast). Yes Other, MD Sharmila        Allergies   Allergen Reactions    Latex Hives    Iodinated Contrast Media Anaphylaxis and Hives       Review of Systems:  Constitutional: Negative for chills and fever  HENT: Negative for congestion and sore throat  Eyes: negative for blurred vision and double vision  Respiratory: Negative for cough, shortness of breath and wheezing  Mouth: Negative for loose, broken or chipped teeth. Cardiovascular: Negative for chest pain and palpitations  Gastrointestinal: Negative for abdominal pain, constipation, diarrhea and nausea  Genitourinary: Negative for dysuria and hematuria  Musculoskeletal: Low back pain  Skin: Negative for rash, open wounds.    Neurological: Negative for dizziness, tremors and headaches  Psychiatric: Anxiety    Objective:     Vitals:    07/14/22 0915   BP: (!) 165/90   Pulse: 61   Resp: 16   Temp: 97 °F (36.1 °C)   SpO2: 97%   Weight: 67.5 kg (148 lb 12.8 oz)        Physical Exam:  General Appearance: Alert, Well Appearing and in no distress  Mental Status: Normal mood, behavior, speech and dress  Neck: Normal appearance externally  Ears: External canal no drainage  Nose: Normal external appearance and no drainage   Chest: Clear to auscultation, no wheezes, rales or rhonchi  Heart: Normal rate, regular rhythm, no murmurs, rubs, clicks or gallops  Skin: Normal color, no lesions externally  Abdomen: Not examined  Neuro: Not examined  Musculoskeletal: Gait antalgic     Recent Results (from the past 168 hour(s))   CBC WITH AUTOMATED DIFF    Collection Time: 07/14/22  9:59 AM   Result Value Ref Range    WBC 6.4 3.6 - 11.0 K/uL    RBC 4.46 3.80 - 5.20 M/uL    HGB 12.8 11.5 - 16.0 g/dL    HCT 38.6 35.0 - 47.0 %    MCV 86.5 80.0 - 99.0 FL    MCH 28.7 26.0 - 34.0 PG    MCHC 33.2 30.0 - 36.5 g/dL    RDW 13.1 11.5 - 14.5 %    PLATELET 807 121 - 305 K/uL    MPV 9.7 8.9 - 12.9 FL    NRBC 0.0 0  WBC    ABSOLUTE NRBC 0.00 0.00 - 0.01 K/uL    NEUTROPHILS 67 32 - 75 %    LYMPHOCYTES 22 12 - 49 %    MONOCYTES 8 5 - 13 %    EOSINOPHILS 2 0 - 7 %    BASOPHILS 1 0 - 1 %    IMMATURE GRANULOCYTES 0 0.0 - 0.5 %    ABS. NEUTROPHILS 4.3 1.8 - 8.0 K/UL    ABS. LYMPHOCYTES 1.4 0.8 - 3.5 K/UL    ABS. MONOCYTES 0.5 0.0 - 1.0 K/UL    ABS. EOSINOPHILS 0.1 0.0 - 0.4 K/UL    ABS. BASOPHILS 0.1 0.0 - 0.1 K/UL    ABS. IMM. GRANS. 0.0 0.00 - 0.04 K/UL    DF AUTOMATED     METABOLIC PANEL, COMPREHENSIVE    Collection Time: 07/14/22  9:59 AM   Result Value Ref Range    Sodium 134 (L) 136 - 145 mmol/L    Potassium 3.9 3.5 - 5.1 mmol/L    Chloride 97 97 - 108 mmol/L    CO2 29 21 - 32 mmol/L    Anion gap 8 5 - 15 mmol/L    Glucose 92 65 - 100 mg/dL    BUN 14 6 - 20 MG/DL    Creatinine 0.76 0.55 - 1.02 MG/DL    BUN/Creatinine ratio 18 12 - 20      GFR est AA >60 >60 ml/min/1.73m2    GFR est non-AA >60 >60 ml/min/1.73m2    Calcium 9.7 8.5 - 10.1 MG/DL    Bilirubin, total 0.6 0.2 - 1.0 MG/DL    ALT (SGPT) 31 12 - 78 U/L    AST (SGOT) 27 15 - 37 U/L    Alk.  phosphatase 120 (H) 45 - 117 U/L    Protein, total 7.7 6.4 - 8.2 g/dL    Albumin 4.2 3.5 - 5.0 g/dL    Globulin 3.5 2.0 - 4.0 g/dL    A-G Ratio 1.2 1.1 - 2.2     HEMOGLOBIN A1C WITH EAG    Collection Time: 07/14/22  9:59 AM   Result Value Ref Range    Hemoglobin A1c 6.1 (H) 4.0 - 5.6 %    Est. average glucose 128 mg/dL   CULTURE, MRSA    Collection Time: 07/14/22  9:59 AM    Specimen: Nares; Nasal   Result Value Ref Range    Special Requests: NO SPECIAL REQUESTS      Culture result: MRSA NOT PRESENT      Culture result:        Screening of patient nares for MRSA is for surveillance purposes and, if positive, to facilitate isolation considerations in high risk settings. It is not intended for automatic decolonization interventions per se as regimens are not sufficiently effective to warrant routine use.    PROTHROMBIN TIME + INR    Collection Time: 07/14/22  9:59 AM   Result Value Ref Range    INR 1.0 0.9 - 1.1      Prothrombin time 10.3 9.0 - 11.1 sec   PTT    Collection Time: 07/14/22  9:59 AM   Result Value Ref Range    aPTT 28.9 22.1 - 31.0 sec    aPTT, therapeutic range     58.0 - 77.0 SECS   URINALYSIS W/ REFLEX CULTURE    Collection Time: 07/14/22  9:59 AM    Specimen: Urine   Result Value Ref Range    Color YELLOW/STRAW      Appearance CLEAR CLEAR      Specific gravity 1.009 1.003 - 1.030      pH (UA) 6.5 5.0 - 8.0      Protein Negative NEG mg/dL    Glucose Negative NEG mg/dL    Ketone Negative NEG mg/dL    Bilirubin Negative NEG      Blood TRACE (A) NEG      Urobilinogen 0.2 0.2 - 1.0 EU/dL    Nitrites Negative NEG      Leukocyte Esterase Negative NEG      UA:UC IF INDICATED CULTURE NOT INDICATED BY UA RESULT CNI      WBC 0-4 0 - 4 /hpf    RBC 5-10 0 - 5 /hpf    Epithelial cells FEW FEW /lpf    Bacteria Negative NEG /hpf    Hyaline cast 0-2 0 - 2 /lpf   VITAMIN D, 25 HYDROXY    Collection Time: 07/14/22  9:59 AM   Result Value Ref Range    Vitamin D 25-Hydroxy 53.5 30 - 100 ng/mL   TYPE & SCREEN    Collection Time: 07/14/22  9:59 AM   Result Value Ref Range    Crossmatch Expiration 07/28/2022,2359     ABO/Rh(D) O POSITIVE     Antibody screen NEG    EKG, 12 LEAD, INITIAL    Collection Time: 07/14/22 10:28 AM   Result Value Ref Range    Ventricular Rate 80 BPM    Atrial Rate 80 BPM    P-R Interval 146 ms    QRS Duration 90 ms    Q-T Interval 408 ms    QTC Calculation (Bezet) 470 ms    Calculated P Axis 9 degrees    Calculated R Axis 26 degrees    Calculated T Axis 46 degrees    Diagnosis       Normal sinus rhythm  Normal ECG  No previous ECGs available  Confirmed by Robert Suarez ([id number]) on 7/14/2022 4:05:26 PM           Assessment:     Lumbar Stenosis  Pre-Operative Evaluation    Plan:     TLIF  MRSA negative  Labs and EKG reviewed.        Signed By: Allison Hurtado NP     July 17, 2022

## 2022-07-16 LAB
BACTERIA SPEC CULT: NORMAL
BACTERIA SPEC CULT: NORMAL
SERVICE CMNT-IMP: NORMAL

## 2022-07-22 ENCOUNTER — ANESTHESIA EVENT (OUTPATIENT)
Dept: SURGERY | Age: 82
DRG: 454 | End: 2022-07-22
Payer: MEDICARE

## 2022-07-25 ENCOUNTER — ANESTHESIA (OUTPATIENT)
Dept: SURGERY | Age: 82
DRG: 454 | End: 2022-07-25
Payer: MEDICARE

## 2022-07-25 ENCOUNTER — HOSPITAL ENCOUNTER (INPATIENT)
Age: 82
LOS: 3 days | Discharge: HOME HEALTH | DRG: 454 | End: 2022-07-28
Attending: ORTHOPAEDIC SURGERY | Admitting: ORTHOPAEDIC SURGERY
Payer: MEDICARE

## 2022-07-25 ENCOUNTER — APPOINTMENT (OUTPATIENT)
Dept: GENERAL RADIOLOGY | Age: 82
DRG: 454 | End: 2022-07-25
Attending: ORTHOPAEDIC SURGERY
Payer: MEDICARE

## 2022-07-25 DIAGNOSIS — G89.18 ACUTE POST-OPERATIVE PAIN: Primary | ICD-10-CM

## 2022-07-25 PROBLEM — M48.061 LUMBAR STENOSIS: Status: ACTIVE | Noted: 2022-07-25

## 2022-07-25 PROCEDURE — C9290 INJ, BUPIVACAINE LIPOSOME: HCPCS | Performed by: ORTHOPAEDIC SURGERY

## 2022-07-25 PROCEDURE — 0ST20ZZ RESECTION OF LUMBAR VERTEBRAL DISC, OPEN APPROACH: ICD-10-PCS | Performed by: ORTHOPAEDIC SURGERY

## 2022-07-25 PROCEDURE — 74011000250 HC RX REV CODE- 250: Performed by: ORTHOPAEDIC SURGERY

## 2022-07-25 PROCEDURE — 74011250637 HC RX REV CODE- 250/637: Performed by: ORTHOPAEDIC SURGERY

## 2022-07-25 PROCEDURE — 76210000000 HC OR PH I REC 2 TO 2.5 HR: Performed by: ORTHOPAEDIC SURGERY

## 2022-07-25 PROCEDURE — 77030026438 HC STYL ET INTUB CARD -A: Performed by: ANESTHESIOLOGY

## 2022-07-25 PROCEDURE — 77030010507 HC ADH SKN DERMBND J&J -B: Performed by: ORTHOPAEDIC SURGERY

## 2022-07-25 PROCEDURE — 74011250636 HC RX REV CODE- 250/636: Performed by: ORTHOPAEDIC SURGERY

## 2022-07-25 PROCEDURE — 77030026188 HC BN CANC CHP CRSH PR LIFV -E: Performed by: ORTHOPAEDIC SURGERY

## 2022-07-25 PROCEDURE — 01NB0ZZ RELEASE LUMBAR NERVE, OPEN APPROACH: ICD-10-PCS | Performed by: ORTHOPAEDIC SURGERY

## 2022-07-25 PROCEDURE — 0SG00AJ FUSION OF LUMBAR VERTEBRAL JOINT WITH INTERBODY FUSION DEVICE, POSTERIOR APPROACH, ANTERIOR COLUMN, OPEN APPROACH: ICD-10-PCS | Performed by: ORTHOPAEDIC SURGERY

## 2022-07-25 PROCEDURE — 74011000250 HC RX REV CODE- 250: Performed by: NURSE ANESTHETIST, CERTIFIED REGISTERED

## 2022-07-25 PROCEDURE — C1713 ANCHOR/SCREW BN/BN,TIS/BN: HCPCS | Performed by: ORTHOPAEDIC SURGERY

## 2022-07-25 PROCEDURE — 77030040361 HC SLV COMPR DVT MDII -B

## 2022-07-25 PROCEDURE — 77030004391 HC BUR FLUT MEDT -C: Performed by: ORTHOPAEDIC SURGERY

## 2022-07-25 PROCEDURE — C1762 CONN TISS, HUMAN(INC FASCIA): HCPCS | Performed by: ORTHOPAEDIC SURGERY

## 2022-07-25 PROCEDURE — 0SG0071 FUSION OF LUMBAR VERTEBRAL JOINT WITH AUTOLOGOUS TISSUE SUBSTITUTE, POSTERIOR APPROACH, POSTERIOR COLUMN, OPEN APPROACH: ICD-10-PCS | Performed by: ORTHOPAEDIC SURGERY

## 2022-07-25 PROCEDURE — 74011250636 HC RX REV CODE- 250/636: Performed by: NURSE ANESTHETIST, CERTIFIED REGISTERED

## 2022-07-25 PROCEDURE — 65270000029 HC RM PRIVATE

## 2022-07-25 PROCEDURE — 76060000038 HC ANESTHESIA 3.5 TO 4 HR: Performed by: ORTHOPAEDIC SURGERY

## 2022-07-25 PROCEDURE — 74011000258 HC RX REV CODE- 258: Performed by: ORTHOPAEDIC SURGERY

## 2022-07-25 PROCEDURE — 77030031139 HC SUT VCRL2 J&J -A: Performed by: ORTHOPAEDIC SURGERY

## 2022-07-25 PROCEDURE — 77030002982 HC SUT POLYSRB J&J -A: Performed by: ORTHOPAEDIC SURGERY

## 2022-07-25 PROCEDURE — 77030012406 HC DRN WND PENRS BARD -A: Performed by: ORTHOPAEDIC SURGERY

## 2022-07-25 PROCEDURE — 8E0WXBF COMPUTER ASSISTED PROCEDURE OF TRUNK REGION, WITH FLUOROSCOPY: ICD-10-PCS | Performed by: ORTHOPAEDIC SURGERY

## 2022-07-25 PROCEDURE — 74011250636 HC RX REV CODE- 250/636: Performed by: ANESTHESIOLOGY

## 2022-07-25 PROCEDURE — 77030014650 HC SEAL MTRX FLOSEL BAXT -C: Performed by: ORTHOPAEDIC SURGERY

## 2022-07-25 PROCEDURE — C1821 INTERSPINOUS IMPLANT: HCPCS | Performed by: ORTHOPAEDIC SURGERY

## 2022-07-25 PROCEDURE — 76010000174 HC OR TIME 3.5 TO 4 HR INTENSV-TIER 1: Performed by: ORTHOPAEDIC SURGERY

## 2022-07-25 PROCEDURE — 77030040922 HC BLNKT HYPOTHRM STRY -A

## 2022-07-25 PROCEDURE — 77030008684 HC TU ET CUF COVD -B: Performed by: ANESTHESIOLOGY

## 2022-07-25 PROCEDURE — 77030038692 HC WND DEB SYS IRMX -B: Performed by: ORTHOPAEDIC SURGERY

## 2022-07-25 PROCEDURE — 00NY0ZZ RELEASE LUMBAR SPINAL CORD, OPEN APPROACH: ICD-10-PCS | Performed by: ORTHOPAEDIC SURGERY

## 2022-07-25 PROCEDURE — 77030033067 HC SUT PDO STRATFX SPIR J&J -B: Performed by: ORTHOPAEDIC SURGERY

## 2022-07-25 PROCEDURE — 2709999900 HC NON-CHARGEABLE SUPPLY: Performed by: ORTHOPAEDIC SURGERY

## 2022-07-25 PROCEDURE — 77030005513 HC CATH URETH FOL11 MDII -B: Performed by: ORTHOPAEDIC SURGERY

## 2022-07-25 DEVICE — IMPLANTABLE DEVICE: Type: IMPLANTABLE DEVICE | Site: SPINE LUMBAR | Status: FUNCTIONAL

## 2022-07-25 DEVICE — ALLOGRAFT BNE MOLD 10 CC VIABLE BNE MTRX VIBONE: Type: IMPLANTABLE DEVICE | Site: SPINE LUMBAR | Status: FUNCTIONAL

## 2022-07-25 DEVICE — SCR SET SPNE STREAMLINE TL --: Type: IMPLANTABLE DEVICE | Site: SPINE LUMBAR | Status: FUNCTIONAL

## 2022-07-25 DEVICE — SPACER SPNL 0.46CC W9XH10X8XL22MM PEEK CNVX BULL TIP TANT: Type: IMPLANTABLE DEVICE | Site: SPINE LUMBAR | Status: FUNCTIONAL

## 2022-07-25 DEVICE — DURAGEN® SUTURABLE DURAL REGENERATION MATRIX, 2 IN X 2 IN (5 CM X 5 CM)
Type: IMPLANTABLE DEVICE | Site: SPINE LUMBAR | Status: FUNCTIONAL
Brand: DURAGEN® SUTURABLE

## 2022-07-25 DEVICE — SCR BNE SPNE 6.5X55MM TI -- STREAMLINE TL: Type: IMPLANTABLE DEVICE | Site: SPINE LUMBAR | Status: FUNCTIONAL

## 2022-07-25 DEVICE — SCR BNE SPNE 6.5X50MM TI -- STREAMLINE TL: Type: IMPLANTABLE DEVICE | Site: SPINE LUMBAR | Status: FUNCTIONAL

## 2022-07-25 DEVICE — BONE CHIP CANC CRSH 1-8MM 30ML --: Type: IMPLANTABLE DEVICE | Site: SPINE LUMBAR | Status: FUNCTIONAL

## 2022-07-25 RX ORDER — DIPHENHYDRAMINE HYDROCHLORIDE 50 MG/ML
12.5 INJECTION, SOLUTION INTRAMUSCULAR; INTRAVENOUS AS NEEDED
Status: DISCONTINUED | OUTPATIENT
Start: 2022-07-25 | End: 2022-07-25 | Stop reason: HOSPADM

## 2022-07-25 RX ORDER — POLYETHYLENE GLYCOL 3350 17 G/17G
17 POWDER, FOR SOLUTION ORAL DAILY
Status: DISCONTINUED | OUTPATIENT
Start: 2022-07-26 | End: 2022-07-28 | Stop reason: HOSPADM

## 2022-07-25 RX ORDER — ROCURONIUM BROMIDE 10 MG/ML
INJECTION, SOLUTION INTRAVENOUS AS NEEDED
Status: DISCONTINUED | OUTPATIENT
Start: 2022-07-25 | End: 2022-07-25 | Stop reason: HOSPADM

## 2022-07-25 RX ORDER — NALOXONE HYDROCHLORIDE 0.4 MG/ML
0.4 INJECTION, SOLUTION INTRAMUSCULAR; INTRAVENOUS; SUBCUTANEOUS AS NEEDED
Status: DISCONTINUED | OUTPATIENT
Start: 2022-07-25 | End: 2022-07-28 | Stop reason: HOSPADM

## 2022-07-25 RX ORDER — PROPOFOL 10 MG/ML
INJECTION, EMULSION INTRAVENOUS AS NEEDED
Status: DISCONTINUED | OUTPATIENT
Start: 2022-07-25 | End: 2022-07-25 | Stop reason: HOSPADM

## 2022-07-25 RX ORDER — AMOXICILLIN 250 MG
1 CAPSULE ORAL 2 TIMES DAILY
Status: DISCONTINUED | OUTPATIENT
Start: 2022-07-25 | End: 2022-07-28 | Stop reason: HOSPADM

## 2022-07-25 RX ORDER — SUCCINYLCHOLINE CHLORIDE 20 MG/ML
INJECTION INTRAMUSCULAR; INTRAVENOUS AS NEEDED
Status: DISCONTINUED | OUTPATIENT
Start: 2022-07-25 | End: 2022-07-25 | Stop reason: HOSPADM

## 2022-07-25 RX ORDER — HYDROMORPHONE HYDROCHLORIDE 1 MG/ML
0.5 INJECTION, SOLUTION INTRAMUSCULAR; INTRAVENOUS; SUBCUTANEOUS
Status: DISPENSED | OUTPATIENT
Start: 2022-07-25 | End: 2022-07-26

## 2022-07-25 RX ORDER — ONDANSETRON 2 MG/ML
4 INJECTION INTRAMUSCULAR; INTRAVENOUS AS NEEDED
Status: DISCONTINUED | OUTPATIENT
Start: 2022-07-25 | End: 2022-07-25 | Stop reason: HOSPADM

## 2022-07-25 RX ORDER — SODIUM CHLORIDE 9 MG/ML
125 INJECTION, SOLUTION INTRAVENOUS CONTINUOUS
Status: DISPENSED | OUTPATIENT
Start: 2022-07-25 | End: 2022-07-26

## 2022-07-25 RX ORDER — FAMOTIDINE 20 MG/1
20 TABLET, FILM COATED ORAL 2 TIMES DAILY
Status: DISCONTINUED | OUTPATIENT
Start: 2022-07-25 | End: 2022-07-28 | Stop reason: HOSPADM

## 2022-07-25 RX ORDER — HYDROMORPHONE HYDROCHLORIDE 1 MG/ML
0.5 INJECTION, SOLUTION INTRAMUSCULAR; INTRAVENOUS; SUBCUTANEOUS
Status: DISPENSED | OUTPATIENT
Start: 2022-07-25 | End: 2022-07-25

## 2022-07-25 RX ORDER — LISINOPRIL AND HYDROCHLOROTHIAZIDE 20; 25 MG/1; MG/1
1 TABLET ORAL DAILY
COMMUNITY

## 2022-07-25 RX ORDER — CYCLOBENZAPRINE HCL 10 MG
10 TABLET ORAL
Status: DISCONTINUED | OUTPATIENT
Start: 2022-07-25 | End: 2022-07-28 | Stop reason: HOSPADM

## 2022-07-25 RX ORDER — ACETAMINOPHEN 325 MG/1
650 TABLET ORAL
Status: DISCONTINUED | OUTPATIENT
Start: 2022-07-25 | End: 2022-07-28 | Stop reason: HOSPADM

## 2022-07-25 RX ORDER — FENTANYL CITRATE 50 UG/ML
INJECTION, SOLUTION INTRAMUSCULAR; INTRAVENOUS AS NEEDED
Status: DISCONTINUED | OUTPATIENT
Start: 2022-07-25 | End: 2022-07-25 | Stop reason: HOSPADM

## 2022-07-25 RX ORDER — PHENYLEPHRINE HCL IN 0.9% NACL 0.4MG/10ML
SYRINGE (ML) INTRAVENOUS AS NEEDED
Status: DISCONTINUED | OUTPATIENT
Start: 2022-07-25 | End: 2022-07-25 | Stop reason: HOSPADM

## 2022-07-25 RX ORDER — LEVOTHYROXINE SODIUM 112 UG/1
112 TABLET ORAL
Status: DISCONTINUED | OUTPATIENT
Start: 2022-07-26 | End: 2022-07-28 | Stop reason: HOSPADM

## 2022-07-25 RX ORDER — ALBUTEROL SULFATE 0.83 MG/ML
2.5 SOLUTION RESPIRATORY (INHALATION) AS NEEDED
Status: DISCONTINUED | OUTPATIENT
Start: 2022-07-25 | End: 2022-07-25 | Stop reason: HOSPADM

## 2022-07-25 RX ORDER — LIDOCAINE HYDROCHLORIDE 20 MG/ML
INJECTION, SOLUTION EPIDURAL; INFILTRATION; INTRACAUDAL; PERINEURAL AS NEEDED
Status: DISCONTINUED | OUTPATIENT
Start: 2022-07-25 | End: 2022-07-25 | Stop reason: HOSPADM

## 2022-07-25 RX ORDER — OXYCODONE HYDROCHLORIDE 5 MG/1
10 TABLET ORAL
Status: DISCONTINUED | OUTPATIENT
Start: 2022-07-25 | End: 2022-07-28 | Stop reason: HOSPADM

## 2022-07-25 RX ORDER — FACIAL-BODY WIPES
10 EACH TOPICAL DAILY PRN
Status: DISCONTINUED | OUTPATIENT
Start: 2022-07-27 | End: 2022-07-28 | Stop reason: HOSPADM

## 2022-07-25 RX ORDER — PROPOFOL 10 MG/ML
INJECTION, EMULSION INTRAVENOUS
Status: DISCONTINUED | OUTPATIENT
Start: 2022-07-25 | End: 2022-07-25 | Stop reason: HOSPADM

## 2022-07-25 RX ORDER — ROSUVASTATIN CALCIUM 10 MG/1
20 TABLET, COATED ORAL
Status: DISCONTINUED | OUTPATIENT
Start: 2022-07-25 | End: 2022-07-28 | Stop reason: HOSPADM

## 2022-07-25 RX ORDER — EPHEDRINE SULFATE/0.9% NACL/PF 50 MG/5 ML
SYRINGE (ML) INTRAVENOUS AS NEEDED
Status: DISCONTINUED | OUTPATIENT
Start: 2022-07-25 | End: 2022-07-25 | Stop reason: HOSPADM

## 2022-07-25 RX ORDER — SODIUM CHLORIDE 0.9 % (FLUSH) 0.9 %
5-40 SYRINGE (ML) INJECTION AS NEEDED
Status: DISCONTINUED | OUTPATIENT
Start: 2022-07-25 | End: 2022-07-28 | Stop reason: HOSPADM

## 2022-07-25 RX ORDER — DIPHENHYDRAMINE HYDROCHLORIDE 50 MG/ML
12.5 INJECTION, SOLUTION INTRAMUSCULAR; INTRAVENOUS
Status: DISCONTINUED | OUTPATIENT
Start: 2022-07-25 | End: 2022-07-28 | Stop reason: HOSPADM

## 2022-07-25 RX ORDER — SODIUM CHLORIDE, SODIUM LACTATE, POTASSIUM CHLORIDE, CALCIUM CHLORIDE 600; 310; 30; 20 MG/100ML; MG/100ML; MG/100ML; MG/100ML
125 INJECTION, SOLUTION INTRAVENOUS CONTINUOUS
Status: DISCONTINUED | OUTPATIENT
Start: 2022-07-25 | End: 2022-07-25 | Stop reason: HOSPADM

## 2022-07-25 RX ORDER — OXYCODONE HYDROCHLORIDE 5 MG/1
5 TABLET ORAL
Status: DISCONTINUED | OUTPATIENT
Start: 2022-07-25 | End: 2022-07-28 | Stop reason: HOSPADM

## 2022-07-25 RX ORDER — VANCOMYCIN HYDROCHLORIDE 1 G/20ML
INJECTION, POWDER, LYOPHILIZED, FOR SOLUTION INTRAVENOUS AS NEEDED
Status: DISCONTINUED | OUTPATIENT
Start: 2022-07-25 | End: 2022-07-25 | Stop reason: HOSPADM

## 2022-07-25 RX ORDER — SERTRALINE HYDROCHLORIDE 50 MG/1
100 TABLET, FILM COATED ORAL EVERY EVENING
Status: DISCONTINUED | OUTPATIENT
Start: 2022-07-25 | End: 2022-07-28 | Stop reason: HOSPADM

## 2022-07-25 RX ORDER — LIDOCAINE HYDROCHLORIDE 10 MG/ML
0.1 INJECTION, SOLUTION EPIDURAL; INFILTRATION; INTRACAUDAL; PERINEURAL AS NEEDED
Status: DISCONTINUED | OUTPATIENT
Start: 2022-07-25 | End: 2022-07-25 | Stop reason: HOSPADM

## 2022-07-25 RX ORDER — ONDANSETRON 2 MG/ML
4 INJECTION INTRAMUSCULAR; INTRAVENOUS
Status: ACTIVE | OUTPATIENT
Start: 2022-07-25 | End: 2022-07-26

## 2022-07-25 RX ORDER — DEXAMETHASONE SODIUM PHOSPHATE 4 MG/ML
INJECTION, SOLUTION INTRA-ARTICULAR; INTRALESIONAL; INTRAMUSCULAR; INTRAVENOUS; SOFT TISSUE AS NEEDED
Status: DISCONTINUED | OUTPATIENT
Start: 2022-07-25 | End: 2022-07-25 | Stop reason: HOSPADM

## 2022-07-25 RX ORDER — HYDROMORPHONE HYDROCHLORIDE 2 MG/ML
INJECTION, SOLUTION INTRAMUSCULAR; INTRAVENOUS; SUBCUTANEOUS AS NEEDED
Status: DISCONTINUED | OUTPATIENT
Start: 2022-07-25 | End: 2022-07-25 | Stop reason: HOSPADM

## 2022-07-25 RX ORDER — SODIUM CHLORIDE, SODIUM LACTATE, POTASSIUM CHLORIDE, CALCIUM CHLORIDE 600; 310; 30; 20 MG/100ML; MG/100ML; MG/100ML; MG/100ML
150 INJECTION, SOLUTION INTRAVENOUS CONTINUOUS
Status: DISCONTINUED | OUTPATIENT
Start: 2022-07-25 | End: 2022-07-25 | Stop reason: HOSPADM

## 2022-07-25 RX ORDER — SODIUM CHLORIDE 0.9 % (FLUSH) 0.9 %
5-40 SYRINGE (ML) INJECTION EVERY 8 HOURS
Status: DISCONTINUED | OUTPATIENT
Start: 2022-07-25 | End: 2022-07-28 | Stop reason: HOSPADM

## 2022-07-25 RX ADMIN — HYDROMORPHONE HYDROCHLORIDE 0.2 MG: 2 INJECTION, SOLUTION INTRAMUSCULAR; INTRAVENOUS; SUBCUTANEOUS at 15:45

## 2022-07-25 RX ADMIN — PROPOFOL 100 MCG/KG/MIN: 10 INJECTION, EMULSION INTRAVENOUS at 12:19

## 2022-07-25 RX ADMIN — HYDROMORPHONE HYDROCHLORIDE: 10 INJECTION INTRAMUSCULAR; INTRAVENOUS; SUBCUTANEOUS at 17:04

## 2022-07-25 RX ADMIN — HYDROMORPHONE HYDROCHLORIDE 0.5 MG: 1 INJECTION, SOLUTION INTRAMUSCULAR; INTRAVENOUS; SUBCUTANEOUS at 16:23

## 2022-07-25 RX ADMIN — DOCUSATE SODIUM 50MG AND SENNOSIDES 8.6MG 1 TABLET: 8.6; 5 TABLET, FILM COATED ORAL at 19:33

## 2022-07-25 RX ADMIN — CEFAZOLIN SODIUM 2 G: 1 POWDER, FOR SOLUTION INTRAMUSCULAR; INTRAVENOUS at 12:10

## 2022-07-25 RX ADMIN — ROCURONIUM BROMIDE 10 MG: 10 INJECTION INTRAVENOUS at 12:03

## 2022-07-25 RX ADMIN — HYDROMORPHONE HYDROCHLORIDE 0.2 MG: 2 INJECTION, SOLUTION INTRAMUSCULAR; INTRAVENOUS; SUBCUTANEOUS at 14:05

## 2022-07-25 RX ADMIN — SUCCINYLCHOLINE CHLORIDE 120 MG: 20 INJECTION, SOLUTION INTRAMUSCULAR; INTRAVENOUS; PARENTERAL at 12:04

## 2022-07-25 RX ADMIN — FENTANYL CITRATE 50 MCG: 50 INJECTION, SOLUTION INTRAMUSCULAR; INTRAVENOUS at 13:45

## 2022-07-25 RX ADMIN — Medication 100 MCG: at 13:16

## 2022-07-25 RX ADMIN — FENTANYL CITRATE 50 MCG: 50 INJECTION, SOLUTION INTRAMUSCULAR; INTRAVENOUS at 11:55

## 2022-07-25 RX ADMIN — FAMOTIDINE 20 MG: 20 TABLET, FILM COATED ORAL at 19:34

## 2022-07-25 RX ADMIN — ROCURONIUM BROMIDE 20 MG: 10 INJECTION INTRAVENOUS at 12:45

## 2022-07-25 RX ADMIN — SODIUM CHLORIDE 125 ML/HR: 9 INJECTION, SOLUTION INTRAVENOUS at 17:04

## 2022-07-25 RX ADMIN — HYDROMORPHONE HYDROCHLORIDE 0.2 MG: 2 INJECTION, SOLUTION INTRAMUSCULAR; INTRAVENOUS; SUBCUTANEOUS at 14:52

## 2022-07-25 RX ADMIN — HYDROMORPHONE HYDROCHLORIDE 0.5 MG: 1 INJECTION, SOLUTION INTRAMUSCULAR; INTRAVENOUS; SUBCUTANEOUS at 16:39

## 2022-07-25 RX ADMIN — DEXAMETHASONE SODIUM PHOSPHATE 4 MG: 4 INJECTION, SOLUTION INTRAMUSCULAR; INTRAVENOUS at 14:48

## 2022-07-25 RX ADMIN — CEFAZOLIN 2 G: 1 INJECTION, POWDER, FOR SOLUTION INTRAMUSCULAR; INTRAVENOUS at 19:34

## 2022-07-25 RX ADMIN — ROSUVASTATIN CALCIUM 20 MG: 10 TABLET, FILM COATED ORAL at 22:07

## 2022-07-25 RX ADMIN — DEXAMETHASONE SODIUM PHOSPHATE 4 MG: 4 INJECTION, SOLUTION INTRAMUSCULAR; INTRAVENOUS at 12:10

## 2022-07-25 RX ADMIN — HYDROMORPHONE HYDROCHLORIDE 0.4 MG: 2 INJECTION, SOLUTION INTRAMUSCULAR; INTRAVENOUS; SUBCUTANEOUS at 11:55

## 2022-07-25 RX ADMIN — Medication 5 MG: at 13:16

## 2022-07-25 RX ADMIN — SODIUM CHLORIDE, POTASSIUM CHLORIDE, SODIUM LACTATE AND CALCIUM CHLORIDE: 600; 310; 30; 20 INJECTION, SOLUTION INTRAVENOUS at 13:12

## 2022-07-25 RX ADMIN — ROCURONIUM BROMIDE 20 MG: 10 INJECTION INTRAVENOUS at 12:18

## 2022-07-25 RX ADMIN — Medication 5 MG: at 12:39

## 2022-07-25 RX ADMIN — SODIUM CHLORIDE, POTASSIUM CHLORIDE, SODIUM LACTATE AND CALCIUM CHLORIDE: 600; 310; 30; 20 INJECTION, SOLUTION INTRAVENOUS at 11:55

## 2022-07-25 RX ADMIN — HYDROMORPHONE HYDROCHLORIDE 0.2 MG: 2 INJECTION, SOLUTION INTRAMUSCULAR; INTRAVENOUS; SUBCUTANEOUS at 15:30

## 2022-07-25 RX ADMIN — SERTRALINE 100 MG: 50 TABLET, FILM COATED ORAL at 22:07

## 2022-07-25 RX ADMIN — HYDROMORPHONE HYDROCHLORIDE 0.5 MG: 1 INJECTION, SOLUTION INTRAMUSCULAR; INTRAVENOUS; SUBCUTANEOUS at 16:47

## 2022-07-25 RX ADMIN — HYDROMORPHONE HYDROCHLORIDE 0.2 MG: 2 INJECTION, SOLUTION INTRAMUSCULAR; INTRAVENOUS; SUBCUTANEOUS at 13:30

## 2022-07-25 RX ADMIN — LIDOCAINE HYDROCHLORIDE 60 MG: 20 INJECTION, SOLUTION EPIDURAL; INFILTRATION; INTRACAUDAL; PERINEURAL at 12:03

## 2022-07-25 RX ADMIN — PROPOFOL 130 MG: 10 INJECTION, EMULSION INTRAVENOUS at 12:03

## 2022-07-25 NOTE — PERIOP NOTES
Updated pt that she will be going to OR sooner than expected R/T a change in Dr Miladys Lao order. Family updated as well.

## 2022-07-25 NOTE — ANESTHESIA POSTPROCEDURE EVALUATION
Procedure(s):  L4 - L5 LAMINECTOMY, FUSION, INSTRUMENTATION, TLIF, AND BONE GRAFT (LATEX ALLERGY). general    Anesthesia Post Evaluation        Patient location during evaluation: bedside  Level of consciousness: awake  Pain management: satisfactory to patient  Airway patency: patent  Anesthetic complications: no  Cardiovascular status: acceptable  Respiratory status: acceptable  Hydration status: acceptable        INITIAL Post-op Vital signs:   Vitals Value Taken Time   /64 07/25/22 1815   Temp 36.3 °C (97.3 °F) 07/25/22 1604   Pulse 98 07/25/22 1820   Resp 13 07/25/22 1820   SpO2 98 % 07/25/22 1820   Vitals shown include unvalidated device data.

## 2022-07-25 NOTE — H&P
Date of Surgery Update:  Mateus Daley was seen and examined. History and physical has been reviewed. The patient has been examined.  There have been no significant clinical changes since the completion of the originally dated History and Physical.    Signed By: Scooby Partida MD     July 25, 2022 10:22 AM

## 2022-07-25 NOTE — PERIOP NOTES
TRANSFER - OUT REPORT:    Verbal report given to Marina(name) on Rahda Beckman  being transferred to preop hold(unit) for routine progression of care       Report consisted of patients Situation, Background, Assessment and   Recommendations(SBAR). Information from the following report(s) SBAR, OR Summary, and MAR was reviewed with the receiving nurse. Lines:   Peripheral IV 07/25/22 Left;Posterior Forearm (Active)   Site Assessment Clean, dry, & intact 07/25/22 1835   Phlebitis Assessment 0 07/25/22 1835   Infiltration Assessment 0 07/25/22 1835   Dressing Status Clean, dry, & intact 07/25/22 1835   Dressing Type Tape;Transparent 07/25/22 1835   Hub Color/Line Status Pink;Patent 07/25/22 1835   Action Taken Open ports on tubing capped 07/25/22 1835   Alcohol Cap Used Yes 07/25/22 1835        Opportunity for questions and clarification was provided.       Patient transported with:   O2 @ 2 liters  Registered Nurse

## 2022-07-25 NOTE — ANESTHESIA PREPROCEDURE EVALUATION
Relevant Problems   No relevant active problems       Anesthetic History   No history of anesthetic complications            Review of Systems / Medical History  Patient summary reviewed, nursing notes reviewed and pertinent labs reviewed    Pulmonary  Within defined limits                 Neuro/Psych   Within defined limits           Cardiovascular  Within defined limits  Hypertension                   GI/Hepatic/Renal  Within defined limits              Endo/Other  Within defined limits    Hypothyroidism       Other Findings              Physical Exam    Airway  Mallampati: II  TM Distance: 4 - 6 cm  Neck ROM: normal range of motion   Mouth opening: Normal     Cardiovascular    Rhythm: regular  Rate: normal         Dental  No notable dental hx       Pulmonary  Breath sounds clear to auscultation               Abdominal         Other Findings            Anesthetic Plan    ASA: 2  Anesthesia type: general            Anesthetic plan and risks discussed with: Patient

## 2022-07-25 NOTE — OP NOTES
Operative Note    Patient: Soren Pierson  YOB: 1940  MRN: [id number]    Date of Procedure: 7/25/2022     Pre-Op Diagnosis: Spondylolisthesis of lumbar region [M43.16]  Spinal stenosis, lumbar region, with neurogenic claudication [M48.062]    Post-Op Diagnosis: Same as preoperative diagnosis. Procedure(s):  L4 - L5 LAMINECTOMY, FUSION, INSTRUMENTATION, TLIF, AND BONE GRAFT (LATEX ALLERGY)    Surgeon(s):  Terrence Pate MD    Surgical Assistant: Physician Assistant: LUIS ANGEL Musa  Surg Asst-1: Maria Esther Wright Anesthesia: General     Estimated Blood Loss (mL):  595     Complications: None    Specimens: * No specimens in log *     Implants:   Implant Name Type Inv.  Item Serial No.  Lot No. LRB No. Used Action   GRAFT DURA [id number] ULTRAPURE POR SUTURABLE DURAGN - SNA  GRAFT DURA [id number] ULTRAPURE POR SUTURABLE DURAGN NA [employer]WD [id number] N/A 1 Implanted   [employer]   [id number] [employer]WD N/A N/A 1 Implanted   BONE CHIP CANC [address] 1-8MM 30ML --  - [id number]  BONE CHIP CANC CRSH 1-8MM 30ML --  [id number] [facility] TISSUE BANK N/A N/A 1 Implanted   SPACER SPNL 0.46CC [id number] PEEK CNVX BULL TIP TANT - SNA  SPACER SPNL 0.46CC [id number] PEEK CNVX BULL TIP TANT NA [employer]WD [id number] N/A 1 Implanted   SCR BNE SPNE 6.5X55MM TI -- STREAMLINE TL - SNA  SCR BNE SPNE 6.5X55MM TI -- STREAMLINE TL NA [employer] NA N/A 2 Implanted   SCR BNE SPNE 6.5X50MM TI -- STREAMLINE TL - SNA  SCR BNE SPNE 6.5X50MM TI -- STREAMLINE TL NA [employer] NA N/A 2 Implanted   SCR SET SPNE STREAMLINE TL --  - SNA  SCR SET SPNE STREAMLINE TL --  NA [employer] NA N/A 4 Implanted   KARRIE SPNE PRE-BNT 5.5X35MM TI -- STREAMLINE TL - SNA  KARRIE SPNE PRE-BNT 5.5X35MM TI -- STREAMLINE TL NA [employer] NA N/A 2 Implanted       Drains:   Hemovac Back (Active)       Findings: as above    Detailed Description of Procedure: [address]  [address], [address] [address]    OPERATIVE REPORT      NAME: Kelly Wu    AGE: [de-identified] y.o. YOB: 1940    MEDICAL RECORD NUMBER: [id number]    DATE OF SURGERY: 7/25/2022    PREOPERATIVE DIAGNOSES:  1. Lumbar stenosis. 2. Acquired lumbar spondylolisthesis. POSTOPERATIVE DIAGNOSES:  1. Lumbar stenosis. 2. Acquired lumbar spondylolisthesis. OPERATIVE PROCEDURES:   1. Laminectomy, partial facetectomy, and foraminotomy of L5.   2. Laminectomy, partial facetectomy, and foraminotomy of L4.   3. Posterolateral fusion and posterior lumbar interbody fusion of L4-5. 4. Pedicle screw instrumentation with RTI pedicle screws for L4 and L5 bilaterally. 5. Application of biomechanical RTI intervertebral body device at L4-5 with RTI. 6. Morselized allograft for spine surgery and local autograft for spine surgery with stem cells. SURGEON: Jenniffer Carrillo MD     ASSISTANT: LUIS ANGEL Pereira    Specimens - none    Implants - see above    ANESTHESIA: General    ESTIMATED BLOOD LOSS: 348 cc    COMPLICATIONS: None apparent    NEUROMONITORING: We used SSEPs and spontaneous EMGs with pedicle screw stimulation    INDICATION: The patient is a very pleasant [de-identified] y.o. female with debilitating leg pain and back pain. She failed conservative measures. She elected to proceed with operative intervention. She was aware of the risks, benefits, and alternatives. She provided informed consent. DESCRIPTION OF PROCEDURE: The patient was identified in the preoperative holding area. The lumbar spine was marked by me. She was transferred to the operating room where general anesthesia was given. She was also given perioperative ancef ntibiotics. She was placed prone on the Elizabeth Broad table. All bony prominences were well padded. The lumbar spine was prepped and draped in the usual standard fashion. We performed a surgical time out.      I made a skin incision from L3 to L5. I exposed the posterior lumbar spine in standard fashion. I took intraoperative fluoroscopy to verify our levels. I exposed the transverse processes of L4 and L5 bilaterally. I then began my decompression by performing a laminectomy of L4. I also performed a partial facetectomy and foraminotomy to decompress the thecal sac and nerve roots of L4. I also performed a laminectomy of L5 with bilateral partial facetectomy and foraminotomy to decompress the thecal sac and traversing L5 nerve roots. I decompressed the stenosis found within the foramen and lateral to the foramen for L4-L5 on the left side. At this point our transforaminal approach to L4-L5 on the left side was complete with exposure of the left L4-5 disc space. I then performed a standard discectomy at L4-5. I prepared the endplates to bleeding bone. I performed trial sizing. I placed a biomechanical device into L4-5 with the appropriate amount of tension and alignment. I placed bone graft. I then cannulated our pedicles for L4 and L5 bilaterally in standard fashion. I probed each pedicle. I copiously irrigated the entire wound. I decorticated our fusion beds bilaterally with a high-speed ulices. I placed our bone graft into our fusions beds bilaterally. I then placed pedicle screws for L4 and L5 bilaterally in standard fashion under fluoroscopic guidance. I had good purchase for each pedicle. I then stimulated all 4 pedicle screws with pedicle screw stimulation. The pedicle screws were found to be within the appropriate range of amplitude. I then placed contoured rods on top of the pedicles bilaterally. The rods were locked to the pedicle screws according to the 's specification with set screws. We had good hemostasis. There was no CSF leaking. The fusion beds were packed with morselized allograft and local autograft. The exposed neurologic elements were protected with Duragen.  I injected the soft tissues with a pain management cocktail. A deep drain was placed. The wound was closed in layers. A sterile dressing was applied. The patient was extubated and transferred to the recovery room in good medical condition. The PA assisted with retraction and closure. Dr. Jason DELANEY, performed the above procedures.      Jason Gil MD  7/25/2022      Electronically Signed by Jason Gil MD on 7/25/2022 at 2:50 PM

## 2022-07-26 LAB
ANION GAP SERPL CALC-SCNC: 5 MMOL/L (ref 5–15)
BUN SERPL-MCNC: 13 MG/DL (ref 6–20)
BUN/CREAT SERPL: 19 (ref 12–20)
CALCIUM SERPL-MCNC: 8.8 MG/DL (ref 8.5–10.1)
CHLORIDE SERPL-SCNC: 105 MMOL/L (ref 97–108)
CO2 SERPL-SCNC: 27 MMOL/L (ref 21–32)
CREAT SERPL-MCNC: 0.67 MG/DL (ref 0.55–1.02)
GLUCOSE BLD STRIP.AUTO-MCNC: 108 MG/DL (ref 65–117)
GLUCOSE SERPL-MCNC: 111 MG/DL (ref 65–100)
HGB BLD-MCNC: 10.9 G/DL (ref 11.5–16)
POTASSIUM SERPL-SCNC: 3.8 MMOL/L (ref 3.5–5.1)
SERVICE CMNT-IMP: NORMAL
SODIUM SERPL-SCNC: 137 MMOL/L (ref 136–145)

## 2022-07-26 PROCEDURE — 74011000250 HC RX REV CODE- 250: Performed by: ORTHOPAEDIC SURGERY

## 2022-07-26 PROCEDURE — 36415 COLL VENOUS BLD VENIPUNCTURE: CPT

## 2022-07-26 PROCEDURE — 97161 PT EVAL LOW COMPLEX 20 MIN: CPT

## 2022-07-26 PROCEDURE — 77010033678 HC OXYGEN DAILY

## 2022-07-26 PROCEDURE — 77030027138 HC INCENT SPIROMETER -A

## 2022-07-26 PROCEDURE — 74011250636 HC RX REV CODE- 250/636: Performed by: ORTHOPAEDIC SURGERY

## 2022-07-26 PROCEDURE — 80048 BASIC METABOLIC PNL TOTAL CA: CPT

## 2022-07-26 PROCEDURE — 82962 GLUCOSE BLOOD TEST: CPT

## 2022-07-26 PROCEDURE — 94761 N-INVAS EAR/PLS OXIMETRY MLT: CPT

## 2022-07-26 PROCEDURE — 97165 OT EVAL LOW COMPLEX 30 MIN: CPT

## 2022-07-26 PROCEDURE — 85018 HEMOGLOBIN: CPT

## 2022-07-26 PROCEDURE — 97535 SELF CARE MNGMENT TRAINING: CPT

## 2022-07-26 PROCEDURE — 97116 GAIT TRAINING THERAPY: CPT

## 2022-07-26 PROCEDURE — 65270000029 HC RM PRIVATE

## 2022-07-26 PROCEDURE — 74011250637 HC RX REV CODE- 250/637: Performed by: ORTHOPAEDIC SURGERY

## 2022-07-26 RX ADMIN — POLYETHYLENE GLYCOL 3350 17 G: 17 POWDER, FOR SOLUTION ORAL at 08:10

## 2022-07-26 RX ADMIN — SODIUM CHLORIDE 125 ML/HR: 9 INJECTION, SOLUTION INTRAVENOUS at 08:48

## 2022-07-26 RX ADMIN — DOCUSATE SODIUM 50MG AND SENNOSIDES 8.6MG 1 TABLET: 8.6; 5 TABLET, FILM COATED ORAL at 17:17

## 2022-07-26 RX ADMIN — SERTRALINE 100 MG: 50 TABLET, FILM COATED ORAL at 20:29

## 2022-07-26 RX ADMIN — FAMOTIDINE 20 MG: 20 TABLET, FILM COATED ORAL at 17:17

## 2022-07-26 RX ADMIN — ROSUVASTATIN CALCIUM 20 MG: 10 TABLET, FILM COATED ORAL at 21:42

## 2022-07-26 RX ADMIN — DOCUSATE SODIUM 50MG AND SENNOSIDES 8.6MG 1 TABLET: 8.6; 5 TABLET, FILM COATED ORAL at 08:09

## 2022-07-26 RX ADMIN — HYDROCHLOROTHIAZIDE: 25 TABLET ORAL at 08:10

## 2022-07-26 RX ADMIN — OXYCODONE 5 MG: 5 TABLET ORAL at 20:29

## 2022-07-26 RX ADMIN — CEFAZOLIN 2 G: 1 INJECTION, POWDER, FOR SOLUTION INTRAMUSCULAR; INTRAVENOUS at 03:29

## 2022-07-26 RX ADMIN — SODIUM CHLORIDE, PRESERVATIVE FREE 10 ML: 5 INJECTION INTRAVENOUS at 03:30

## 2022-07-26 RX ADMIN — LEVOTHYROXINE SODIUM 112 MCG: 0.11 TABLET ORAL at 08:11

## 2022-07-26 RX ADMIN — SODIUM CHLORIDE, PRESERVATIVE FREE 10 ML: 5 INJECTION INTRAVENOUS at 15:27

## 2022-07-26 RX ADMIN — FAMOTIDINE 20 MG: 20 TABLET, FILM COATED ORAL at 08:09

## 2022-07-26 RX ADMIN — OXYCODONE 10 MG: 5 TABLET ORAL at 17:17

## 2022-07-26 NOTE — PROGRESS NOTES
ORTHOPAEDIC LUMBAR FUSION PROGRESS NOTE    NAME:     Angela Zimmerman   :       1940   MRN:       [id number]   DATE:      2022    POD:    1 Day Post-Op  S/P:    Procedure(s):  L4 - L5 LAMINECTOMY, FUSION, INSTRUMENTATION, TLIF, AND BONE GRAFT (LATEX ALLERGY)    SUBJECTIVE:    C/O back pain along surgical incision  No leg pain or numbness  Denies nausea/vomiting, headache, chest pain or shortness of breath  Pain controlled    Recent Labs     22  0334   HGB 10.9*      K 3.8      CO2 27   BUN 13   CREA 0.67   *     Patient Vitals for the past 12 hrs:   BP Temp Pulse Resp SpO2   22 1000 120/68 -- 80 -- --   22 0930 (!) 132/57 98.4 °F (36.9 °C) 82 16 99 %   22 0810 (!) 140/61 -- 75 -- --   22 0725 (!) 123/57 98.5 °F (36.9 °C) 74 12 100 %   22 0430 (!) 159/86 98.6 °F (37 °C) 85 11 100 %   22 0030 (!) 143/61 98.5 °F (36.9 °C) 83 12 97 %       EXAM:  Dressings clean, dry and intact   Positive strength/ROM bilat lower ext.   Neuro intact to sensation  Calves, soft & nontender  BL LEs NVID      PLAN:  D/C PCA, change to prn PO pain medications  Monitor hemovac drain output  PT/OT, OOB w/ assist  Advance diet as tolerated      Abdullahi Santiago [place]  Orthopaedic Surgery  Physician Assistant to Dr. Renu Ibarra

## 2022-07-26 NOTE — PROGRESS NOTES
Problem: Mobility Impaired (Adult and Pediatric)  Goal: *Acute Goals and Plan of Care (Insert Text)  Description: FUNCTIONAL STATUS PRIOR TO ADMISSION: Patient was independent and active without use of DME. Occasional use of SPC. HOME SUPPORT PRIOR TO ADMISSION: The patient lived alone with friends and daughter to provide assistance upon d/c home. Physical Therapy Goals  Initiated 7/26/2022    1. Patient will move from supine to sit and sit to supine , scoot up and down, and roll side to side in bed with independence within 4 days. 2. Patient will perform sit to stand with modified independence within 4 days. 3. Patient will ambulate with modified independence for 300 feet with the least restrictive device within 4 days. 4. Patient will ascend/descend 3 stairs with 2 handrail(s) with modified independence within 4 days. 5. Patient will verbalize and demonstrate understanding of spinal precautions (No bending, lifting greater than 5 lbs, or twisting; log-roll technique; frequent repositioning as instructed) within 4 days. Outcome: Progressing Towards Goal   PHYSICAL THERAPY EVALUATION  Patient: Perlita Duong (50 y.o. female)  Date: 7/26/2022  Primary Diagnosis: Spondylolisthesis of lumbar region [M43.16]  Spinal stenosis, lumbar region, with neurogenic claudication [M48.062]  Lumbar stenosis [M48.061]  Procedure(s) (LRB):  L4 - L5 LAMINECTOMY, FUSION, INSTRUMENTATION, TLIF, AND BONE GRAFT (LATEX ALLERGY) (N/A) 1 Day Post-Op   Precautions:   Fall, Back    ASSESSMENT  Based on the objective data described below, the patient presents with incisional back pain, imapired standing balance, increased fall risk, generalized weakness and functional mobility below INDEP baseline following admission for L4-5 lami/fusion POD#1. Educated on back precautions, log rolling and brace wear. Pt reports she has been practicing with the brace at home. Donned brace with Min A while sitting EOB.  Transfers to EOB with SUP, stands with Min A and ambulates 20ft into the bathroom. Pt's gait antalgic with L sided limp and excessive trunk sway. Provided HHA for safety into the bathroom. Given RW for progression of gait training. Much improved stability with RW. Tolerates gait distance of 60ft with CGA. Pt reports pain in L side improved from pre-op status. Up in chair at end of session with stable vitals, PCA in hand and call bell in lap. Anticipate steady progress acutely. Current Level of Function Impacting Discharge (mobility/balance): use of RW for mobility otherwise Min A    Functional Outcome Measure: The patient scored 19/28 on the Tinetti outcome measure which is indicative of moderate fall risk. Other factors to consider for discharge: fall risk elevated, pain control, drain in place     Patient will benefit from skilled therapy intervention to address the above noted impairments. PLAN :  Recommendations and Planned Interventions: bed mobility training, transfer training, gait training, therapeutic exercises, neuromuscular re-education, patient and family training/education, and therapeutic activities      Frequency/Duration: Patient will be followed by physical therapy:  twice daily to address goals. Recommendation for discharge: (in order for the patient to meet his/her long term goals)  Physical therapy at least 2 days/week in the home     This discharge recommendation:  Has been made in collaboration with the attending provider and/or case management    IF patient discharges home will need the following DME: patient owns DME required for discharge         SUBJECTIVE:   Patient stated I always got off on the R side of the bed because my back hurt so much.     OBJECTIVE DATA SUMMARY:   HISTORY:    Past Medical History:   Diagnosis Date    Anxiety     Bleeds easily ([address]     During Cataract sx had to have Vitamin K- never tested for genetic clotting disorder    High cholesterol     Hypertension     Hypothyroid     White coat syndrome with hypertension      Past Surgical History:   Procedure Laterality Date    HX ANKLE FRACTURE TX Right     HX CATARACT REMOVAL Bilateral     HX HYSTERECTOMY         Personal factors and/or comorbidities impacting plan of care: anxiety, HTN    Home Situation  Home Environment: Private residence  # Steps to Enter: 4  Rails to Enter: Yes  Hand Rails : Bilateral  One/Two Story Residence: One story  Living Alone: Yes  Support Systems: Child(emery), Friend/Neighbor  Patient Expects to be Discharged to[de-identified] Home with family assistance  Current DME Used/Available at Home: Clement Leydi, straight, Walker, rolling, Safety frame toliet  Tub or Shower Type: Shower    EXAMINATION/PRESENTATION/DECISION MAKING:   Critical Behavior:  Neurologic State: Alert  Orientation Level: Oriented X4  Cognition: Appropriate decision making, Appropriate for age attention/concentration, Appropriate safety awareness, Follows commands     Hearing: Auditory  Auditory Impairment: None  Skin:    Edema:   Range Of Motion:  AROM: Within functional limits           PROM: Within functional limits           Strength:    Strength: Generally decreased, functional                    Tone & Sensation:   Tone: Normal              Sensation: Intact               Coordination:  Coordination: Within functional limits  Vision:      Functional Mobility:  Bed Mobility:  Rolling: Supervision  Supine to Sit: Supervision; Additional time        Transfers:  Sit to Stand: Minimum assistance  Stand to Sit: Contact guard assistance        Bed to Chair: Minimum assistance              Balance:   Sitting: Intact  Standing: Impaired  Standing - Static: Fair  Standing - Dynamic : Fair;Constant support  Ambulation/Gait Training:  Distance (ft): 60 Feet (ft)  Assistive Device: Gait belt;Walker, rolling;Brace/Splint  Ambulation - Level of Assistance: Contact guard assistance     Gait Description (WDL): Exceptions to WDL  Gait Abnormalities: Decreased step clearance; Antalgic; Shuffling gait        Base of Support: Narrowed     Speed/Kylie: Slow;Pace decreased (<100 feet/min); Shuffled  Step Length: Right shortened;Left shortened                  Functional Measure:  Tinetti test:    Sitting Balance: 1  Arises: 1  Attempts to Rise: 2  Immediate Standing Balance: 1  Standing Balance: 1  Nudged: 1  Eyes Closed: 1  Turn 360 Degrees - Continuous/Discontinuous: 1  Turn 360 Degrees - Steady/Unsteady: 1  Sitting Down: 1  Balance Score: 11 Balance total score  Indication of Gait: 1  R Step Length/Height: 1  L Step Length/Height: 1  R Foot Clearance: 1  L Foot Clearance: 1  Step Symmetry: 1  Step Continuity: 1  Path: 1  Trunk: 0  Walking Time: 0  Gait Score: 8 Gait total score  Total Score: 19/28 Overall total score         Tinetti Tool Score Risk of Falls  <19 = High Fall Risk  19-24 = Moderate Fall Risk  25-28 = Low Fall Risk  Tinetti ME. Performance-Oriented Assessment of Mobility Problems in Elderly Patients. [facility] 66; [id number].  (Scoring Description: PT Bulletin Feb. 10, 1993)    Older adults: Petey Bryson et al, 2009; n = 1000 [facility] elderly evaluated with ABC, SUSY, ADL, and IADL)  · Mean SUSY score for males aged 69-68 years = 26.21(3.40)  · Mean SUSY score for females age 69-68 years = 25.16(4.30)  · Mean SUSY score for males over 80 years = 23.29(6.02)  · Mean SUSY score for females over 80 years = 17.20(8.32)            Physical Therapy Evaluation Charge Determination   History Examination Presentation Decision-Making   MEDIUM  Complexity : 1-2 comorbidities / personal factors will impact the outcome/ POC  MEDIUM Complexity : 3 Standardized tests and measures addressing body structure, function, activity limitation and / or participation in recreation  LOW Complexity : Stable, uncomplicated  Other outcome measures Tinetti  LOW       Based on the above components, the patient evaluation is determined to be of the following complexity level: LOW     Pain Ratin/10    Activity Tolerance:   Good    After treatment patient left in no apparent distress:   Sitting in chair and Call bell within reach    COMMUNICATION/EDUCATION:   The patients plan of care was discussed with: Registered nurse. Fall prevention education was provided and the patient/caregiver indicated understanding., Patient/family have participated as able in goal setting and plan of care. , and Patient/family agree to work toward stated goals and plan of care.     Thank you for this referral.  Valorie Yuan, PT

## 2022-07-26 NOTE — PROGRESS NOTES
Problem: Mobility Impaired (Adult and Pediatric)  Goal: *Acute Goals and Plan of Care (Insert Text)  Description: FUNCTIONAL STATUS PRIOR TO ADMISSION: Patient was independent and active without use of DME. Occasional use of SPC. HOME SUPPORT PRIOR TO ADMISSION: The patient lived alone with friends and daughter to provide assistance upon d/c home. Physical Therapy Goals  Initiated 7/26/2022    1. Patient will move from supine to sit and sit to supine , scoot up and down, and roll side to side in bed with independence within 4 days. 2. Patient will perform sit to stand with modified independence within 4 days. 3. Patient will ambulate with modified independence for 300 feet with the least restrictive device within 4 days. 4. Patient will ascend/descend 3 stairs with 2 handrail(s) with modified independence within 4 days. 5. Patient will verbalize and demonstrate understanding of spinal precautions (No bending, lifting greater than 5 lbs, or twisting; log-roll technique; frequent repositioning as instructed) within 4 days. 7/26/2022 1509 by Giselle Mcleod PT  Outcome: Progressing Towards Goal   PHYSICAL THERAPY TREATMENT  Patient: Phani Melvin (56 y.o. female)  Date: 7/26/2022  Diagnosis: Spondylolisthesis of lumbar region [M43.16]  Spinal stenosis, lumbar region, with neurogenic claudication [M48.062]  Lumbar stenosis [M48.061] <principal problem not specified>  Procedure(s) (LRB):  L4 - L5 LAMINECTOMY, FUSION, INSTRUMENTATION, TLIF, AND BONE GRAFT (LATEX ALLERGY) (N/A) 1 Day Post-Op  Precautions: Fall, Back No bending, no lifting greater than 5 lbs, no twisting, log-roll technique, repositioning every 20-30 min except when sleeping, brace when OOB (if ordered)  Chart, physical therapy assessment, plan of care and goals were reviewed. ASSESSMENT  Patient continues with skilled PT services and is progressing towards goals. Pt requesting to utilize bathroom upon entering room.  Pt with good recall of log roll technique and completes with SBA. With RW pt ambulates to bathroom with SBA. Dons brace in standing after toileting with CGA for stability. Pt eager to walk in the hallway given significant improvement in pain levels post-operatively. Completes distance of 195ft with steady gait speed, equal foot clearance and safe RW use. Responds well to verbal cueing for upright posture as pt tends to lean to the left out of habit. Continue to recommend use of RW at all times given impaired balance and altered gait mechanics without AD. Up in chair at end of session with all needs in reach. Current Level of Function Impacting Discharge (mobility/balance): CGA-SBA for mobility, tolerating well, will need to clear steps prior to d/c    Other factors to consider for discharge: hemovac in place, on PCA         PLAN :  Patient continues to benefit from skilled intervention to address the above impairments. Continue treatment per established plan of care. to address goals. Recommendation for discharge: (in order for the patient to meet his/her long term goals)  Physical therapy at least 2 days/week in the home     This discharge recommendation:  Has been made in collaboration with the attending provider and/or case management    IF patient discharges home will need the following DME: patient owns DME required for discharge       SUBJECTIVE:   Patient stated Zulma Dhillon got dizzy up in the chair earlier today.     OBJECTIVE DATA SUMMARY:   Critical Behavior:  Neurologic State: Alert  Orientation Level: Oriented X4  Cognition: Appropriate decision making, Appropriate for age attention/concentration, Appropriate safety awareness, Follows commands       Spinal diagnosis intervention:  The patient stated 2/3 back precautions when prompted. Reviewed all 3 back precautions, log roll technique, and sitting for 30 minutes at a time. The patient required verbal cues to maintain back precautions during functional activity. Reviewed back brace application and wear schedule. Brace donned with supervision/set-up      Functional Mobility Training:    Bed Mobility:  Log Rolling: Supervision  Supine to Sit: Supervision              Transfers:  Sit to Stand: Contact guard assistance  Stand to Sit: Stand-by assistance        Bed to Chair: Stand-by assistance                    Balance:  Sitting: Intact  Standing: Intact; With support  Standing - Static: Good;Constant support  Standing - Dynamic : Fair;Constant support  Ambulation/Gait Training:  Distance (ft): 195 Feet (ft)  Assistive Device: Gait belt;Walker, rolling;Brace/Splint  Ambulation - Level of Assistance: Contact guard assistance     Gait Description (WDL): Exceptions to WDL  Gait Abnormalities: Antalgic        Base of Support: Narrowed     Speed/Kylie: Pace decreased (<100 feet/min)  Step Length: Right shortened;Left shortened                  Activity Tolerance:   Good    After treatment patient left in no apparent distress:   Sitting in chair, Call bell within reach, and Bed / chair alarm activated    COMMUNICATION/COLLABORATION:   The patients plan of care was discussed with: Registered nurse.      Bib Lang, PT   Time Calculation: 16 mins

## 2022-07-26 NOTE — ROUTINE PROCESS
Patient status as charted. Report called to [facility] in [employer]. Patient and belongings transported in bed. She has no complaints.

## 2022-07-26 NOTE — PROGRESS NOTES
Reason for Admission:  Spondylolisthesis of lumbar region,   Spinal stenosis, lumbar region, with neurogenic claudication,   Lumbar stenosis                   RUR Score:    4%                 Plan for utilizing home health:    Yes - [facility]      PCP: First and Last name:  Belkis Wilson MD   Name of Practice:    Are you a current patient: Yes/No: yes   Approximate date of last visit: last month   Can you participate in a virtual visit with your PCP: yes                    Current Advanced Directive/Advance Care Plan: AMD on file    Healthcare Decision Maker:   Estrella Bianchi, daughter - [phone]                       Transition of Care Plan:       CM met with patient for discharge planning. Charted address and contact information were confirmed. Patient lives alone in a one story house with four entry steps. Her daughter, who is a nurse, will be staying with her for a few days post discharge, and she has friends that are also able to provide assistance. Patient reports having a raised toilet seat, cane, rolling walker and blood pressure cuff. Her preferred pharmacy is [facility] in [place]. CM to follow  PT/OT consulted  Home with family assistance and HH when medically stable for d/c  Patient's friend will transport at d/c  Outpatient follow-up    Care Management Interventions  PCP Verified by CM:  Yes  Transition of Care Consult (CM Consult): Home Health, Discharge [place] [address] [address] [address]: No  Reason Outside [place]: Patient already serviced by other home care/hospice agency  Physical Therapy Consult: Yes  Occupational Therapy Consult: Yes  Support Systems: Child(emery), Friend/Neighbor  Confirm Follow Up Transport: Friends  The Patient and/or Patient Representative was Provided with a Choice of Provider and Agrees with the Discharge Plan?: Yes  Freedom of Choice List was Provided with Basic Dialogue that Supports the Patient's Individualized Plan of Care/Goals, Treatment Preferences and Shares the Quality Data Associated with the Providers?: Yes  Discharge Location  Patient Expects to be Discharged to[de-identified] Home with home health     Pardeep Ko LCSW

## 2022-07-26 NOTE — PROGRESS NOTES
Problem: Self Care Deficits Care Plan (Adult)  Goal: *Acute Goals and Plan of Care (Insert Text)  Description: FUNCTIONAL STATUS PRIOR TO ADMISSION: Patient was independent and active without use of DME.     HOME SUPPORT: The patient lived alone with friends and daughters available to assist.    Occupational Therapy Goals  Initiated 7/26/2022    1. Patient will perform lower body dressing with supervision/set-up using AE PRN within 7 days. 2.  Patient will perform toilet transfer with supervision/set-up using most appropriate DME within 7 days. 3.  Patient will grooming, standing at sink, at supervision/set-up within 7 days. 4.  Patient will don/doff back brace at supervision/set-up within 7 days. 5.  Patient will verbalize/demonstrate 3/3 back precautions during ADL tasks without cues within 7 days. Outcome: Progressing Towards Goal   OCCUPATIONAL THERAPY EVALUATION  Patient: Lawrence Christianson (5 y.o. female)  Date: 7/26/2022  Primary Diagnosis: Spondylolisthesis of lumbar region [M43.16]  Spinal stenosis, lumbar region, with neurogenic claudication [M48.062]  Lumbar stenosis [M48.061]  Procedure(s) (LRB):  L4 - L5 LAMINECTOMY, FUSION, INSTRUMENTATION, TLIF, AND BONE GRAFT (LATEX ALLERGY) (N/A) 1 Day Post-Op   Precautions:   Fall, Back    ASSESSMENT  Based on the objective data described below, the patient presents with hospital admission secondary to L5-L5 laminectomy, fusion, instrumentation. Patient received seated in chair, agreeable to activity. Patient performs sit to stand with CGA. Sheis able to transfer with CGA to bathroom, and requires light assist for commode transfer. Patient to sink for hand hygiene and education regarding ADL tasks. Patient with attempt for LE cross leg technique, but unable today and will benefit from training for use of AE. Current Level of Function Impacting Discharge (ADLs/self-care): min-CGA    Functional Outcome Measure:   The patient scored 55/100 on the Barthel INDex  outcome measure . Other factors to consider for discharge: none     Patient will benefit from skilled therapy intervention to address the above noted impairments. PLAN :  Recommendations and Planned Interventions: self care training, functional mobility training, therapeutic exercise, balance training, therapeutic activities, endurance activities, patient education, home safety training, and family training/education    Frequency/Duration: Patient will be followed by occupational therapy 5 times a week to address goals.     Recommendation for discharge: (in order for the patient to meet his/her long term goals)  To be determined: likely no OT follow up    This discharge recommendation:  Has been made in collaboration with the attending provider and/or case management    IF patient discharges home will need the following DME: TBD       SUBJECTIVE:   Patient stated I need to put my makeup on.    OBJECTIVE DATA SUMMARY:   HISTORY:   Past Medical History:   Diagnosis Date    Anxiety     Bleeds easily ([address]     During Cataract sx had to have Vitamin K- never tested for genetic clotting disorder    High cholesterol     Hypertension     Hypothyroid     White coat syndrome with hypertension      Past Surgical History:   Procedure Laterality Date    HX ANKLE FRACTURE TX Right     HX CATARACT REMOVAL Bilateral     HX HYSTERECTOMY         Expanded or extensive additional review of patient history:     Home Situation  Home Environment: Private residence  # Steps to Enter: 4  Rails to Enter: Yes  Hand Rails : Bilateral  One/Two Story Residence: One story  Living Alone: Yes  Support Systems: Child(emery), Friend/Neighbor  Patient Expects to be Discharged to[de-identified] Home with home health  Current DME Used/Available at Home: Cane, straight, Walker, rolling, Safety frame toliet  Tub or Shower Type: Shower    Hand dominance: Right    EXAMINATION OF PERFORMANCE DEFICITS:  Cognitive/Behavioral Status:  Neurologic State: Alert  Orientation Level: Oriented X4  Cognition: Appropriate decision making; Appropriate for age attention/concentration; Appropriate safety awareness; Follows commands             Skin: intact as seen    Edema: none noted     Hearing: Auditory  Auditory Impairment: None    Vision/Perceptual:                                     Range of Motion:  AROM: Within functional limits  PROM: Within functional limits                      Strength:  Strength: Generally decreased, functional                Coordination:  Coordination: Within functional limits            Tone & Sensation:  Tone: Normal  Sensation: Intact                      Balance:  Sitting: Intact  Standing: Intact; With support  Standing - Static: Good;Constant support  Standing - Dynamic : Fair;Constant support    Functional Mobility and Transfers for ADLs:  Bed Mobility:  Rolling: Supervision  Supine to Sit: Supervision    Transfers:  Sit to Stand: Contact guard assistance  Stand to Sit: Stand-by assistance  Bed to Chair: Stand-by assistance  Bathroom Mobility: Contact guard assistance  Toilet Transfer : Contact guard assistance  Assistive Device : Walker, rolling    ADL Assessment:  Feeding: Independent    Oral Facial Hygiene/Grooming: Setup (seated)    Bathing: Contact guard assistance         Upper Body Dressing: Minimum assistance (including brace)    Lower Body Dressing: Minimum assistance    Toileting: Minimum assistance                  ADL Intervention and task modifications:                                                 Therapeutic Exercise:     Functional Measure:  . Barthel Index:  Bathin  Bladder: 10  Bowels: 10  Groomin  Dressin  Feeding: 10  Mobility: 0  Stairs: 0  Toilet Use: 5  Transfer (Bed to Chair and Back): 10  Total: 55/100      The Barthel ADL Index: Guidelines  1. The index should be used as a record of what a patient does, not as a record of what a patient could do.   2. The main aim is to establish degree of independence from any help, physical or verbal, however minor and for whatever reason. 3. The need for supervision renders the patient not independent. 4. A patient's performance should be established using the best available evidence. Asking the patient, friends/relatives and nurses are the usual sources, but direct observation and common sense are also important. However direct testing is not needed. 5. Usually the patient's performance over the preceding 24-48 hours is important, but occasionally longer periods will be relevant. 6. Middle categories imply that the patient supplies over 50 per cent of the effort. 7. Use of aids to be independent is allowed. Score Interpretation (from [address] [address])    Independent   60-79 Minimally independent   40-59 Partially dependent   20-39 Very dependent   <20 Totally dependent     -Arina Tenorio., Barthel, DBibiW. (1965). Functional evaluation: the Barthel Index. [facility] ([address] (1997). The Barthel activities of daily living index: self-reporting versus actual performance in the old (> or = 75 years). Journal [address] 45(7), 14 [facility], J.ELIDAF, Rachel Kowalski., [facility]. (1999). Measuring the change in disability after inpatient rehabilitation; comparison of the responsiveness of the Barthel Index and Functional [place] Measure. Journal of Neurology, Neurosurgery, and Psychiatry, 66(4), 700-169. Kelly Paulo, N.J.A, TEODORA Gotti, & Arlene Chaves M.A. (2004) Assessment of post-stroke quality of life in cost-effectiveness studies: The usefulness of the Barthel Index and the EuroQoL-5D.  Quality of Life Research, 15, 251-63         Occupational Therapy Evaluation Charge Determination   History Examination Decision-Making   LOW Complexity : Brief history review  LOW Complexity : 1-3 performance deficits relating to physical, cognitive , or psychosocial skils that result in activity limitations and / or participation restrictions  LOW Complexity : No comorbidities that affect functional and no verbal or physical assistance needed to complete eval tasks       Based on the above components, the patient evaluation is determined to be of the following complexity level: LOW   Pain Rating:      Activity Tolerance:   Good    After treatment patient left in no apparent distress:    Sitting in chair, Call bell within reach, Bed / chair alarm activated, and Caregiver / family present    COMMUNICATION/EDUCATION:   The patients plan of care was discussed with: Physical therapist and Registered nurse. Home safety education was provided and the patient/caregiver indicated understanding., Patient/family have participated as able in goal setting and plan of care. , and Patient/family agree to work toward stated goals and plan of care. This patients plan of care is appropriate for delegation to [facility].     Thank you for this referral.  Juliana Joshua OTR/L  Time Calculation: 23 mins

## 2022-07-27 LAB
ANION GAP SERPL CALC-SCNC: 6 MMOL/L (ref 5–15)
BUN SERPL-MCNC: 10 MG/DL (ref 6–20)
BUN/CREAT SERPL: 16 (ref 12–20)
CALCIUM SERPL-MCNC: 8.7 MG/DL (ref 8.5–10.1)
CHLORIDE SERPL-SCNC: 106 MMOL/L (ref 97–108)
CO2 SERPL-SCNC: 28 MMOL/L (ref 21–32)
CREAT SERPL-MCNC: 0.61 MG/DL (ref 0.55–1.02)
GLUCOSE BLD STRIP.AUTO-MCNC: 104 MG/DL (ref 65–117)
GLUCOSE BLD STRIP.AUTO-MCNC: 125 MG/DL (ref 65–117)
GLUCOSE BLD STRIP.AUTO-MCNC: 96 MG/DL (ref 65–117)
GLUCOSE SERPL-MCNC: 105 MG/DL (ref 65–100)
HGB BLD-MCNC: 8.9 G/DL (ref 11.5–16)
POTASSIUM SERPL-SCNC: 3.2 MMOL/L (ref 3.5–5.1)
SERVICE CMNT-IMP: ABNORMAL
SERVICE CMNT-IMP: NORMAL
SERVICE CMNT-IMP: NORMAL
SODIUM SERPL-SCNC: 140 MMOL/L (ref 136–145)

## 2022-07-27 PROCEDURE — 65270000029 HC RM PRIVATE

## 2022-07-27 PROCEDURE — 80048 BASIC METABOLIC PNL TOTAL CA: CPT

## 2022-07-27 PROCEDURE — 97116 GAIT TRAINING THERAPY: CPT

## 2022-07-27 PROCEDURE — 94761 N-INVAS EAR/PLS OXIMETRY MLT: CPT

## 2022-07-27 PROCEDURE — 74011250637 HC RX REV CODE- 250/637: Performed by: NURSE PRACTITIONER

## 2022-07-27 PROCEDURE — 85018 HEMOGLOBIN: CPT

## 2022-07-27 PROCEDURE — 82962 GLUCOSE BLOOD TEST: CPT

## 2022-07-27 PROCEDURE — 74011000250 HC RX REV CODE- 250: Performed by: ORTHOPAEDIC SURGERY

## 2022-07-27 PROCEDURE — 97535 SELF CARE MNGMENT TRAINING: CPT

## 2022-07-27 PROCEDURE — 36415 COLL VENOUS BLD VENIPUNCTURE: CPT

## 2022-07-27 PROCEDURE — 97530 THERAPEUTIC ACTIVITIES: CPT

## 2022-07-27 PROCEDURE — 74011250637 HC RX REV CODE- 250/637: Performed by: ORTHOPAEDIC SURGERY

## 2022-07-27 RX ADMIN — LEVOTHYROXINE SODIUM 112 MCG: 0.11 TABLET ORAL at 09:29

## 2022-07-27 RX ADMIN — HYDROCHLOROTHIAZIDE: 25 TABLET ORAL at 09:25

## 2022-07-27 RX ADMIN — OXYCODONE 10 MG: 5 TABLET ORAL at 06:01

## 2022-07-27 RX ADMIN — ROSUVASTATIN CALCIUM 20 MG: 10 TABLET, FILM COATED ORAL at 21:20

## 2022-07-27 RX ADMIN — POTASSIUM BICARBONATE 40 MEQ: 782 TABLET, EFFERVESCENT ORAL at 18:05

## 2022-07-27 RX ADMIN — POLYETHYLENE GLYCOL 3350 17 G: 17 POWDER, FOR SOLUTION ORAL at 10:15

## 2022-07-27 RX ADMIN — OXYCODONE 10 MG: 5 TABLET ORAL at 09:25

## 2022-07-27 RX ADMIN — SODIUM CHLORIDE, PRESERVATIVE FREE 10 ML: 5 INJECTION INTRAVENOUS at 21:21

## 2022-07-27 RX ADMIN — OXYCODONE 5 MG: 5 TABLET ORAL at 21:20

## 2022-07-27 RX ADMIN — OXYCODONE 5 MG: 5 TABLET ORAL at 18:05

## 2022-07-27 RX ADMIN — SODIUM CHLORIDE, PRESERVATIVE FREE 10 ML: 5 INJECTION INTRAVENOUS at 06:02

## 2022-07-27 RX ADMIN — DOCUSATE SODIUM 50MG AND SENNOSIDES 8.6MG 1 TABLET: 8.6; 5 TABLET, FILM COATED ORAL at 18:05

## 2022-07-27 RX ADMIN — FAMOTIDINE 20 MG: 20 TABLET, FILM COATED ORAL at 18:05

## 2022-07-27 RX ADMIN — SODIUM CHLORIDE, PRESERVATIVE FREE 10 ML: 5 INJECTION INTRAVENOUS at 21:29

## 2022-07-27 RX ADMIN — OXYCODONE 10 MG: 5 TABLET ORAL at 00:01

## 2022-07-27 RX ADMIN — SODIUM CHLORIDE, PRESERVATIVE FREE 10 ML: 5 INJECTION INTRAVENOUS at 14:45

## 2022-07-27 RX ADMIN — DOCUSATE SODIUM 50MG AND SENNOSIDES 8.6MG 1 TABLET: 8.6; 5 TABLET, FILM COATED ORAL at 09:25

## 2022-07-27 RX ADMIN — POTASSIUM BICARBONATE 40 MEQ: 782 TABLET, EFFERVESCENT ORAL at 12:03

## 2022-07-27 RX ADMIN — OXYCODONE 10 MG: 5 TABLET ORAL at 03:05

## 2022-07-27 RX ADMIN — FAMOTIDINE 20 MG: 20 TABLET, FILM COATED ORAL at 09:25

## 2022-07-27 RX ADMIN — OXYCODONE 5 MG: 5 TABLET ORAL at 14:43

## 2022-07-27 NOTE — PROGRESS NOTES
19:30  Bedside and Verbal shift change report given to Shadia Saunders (oncoming nurse) by Bailee Morris (offgoing nurse). Report included the following information SBAR, Procedure Summary, Intake/Output, MAR, and Recent Results.

## 2022-07-27 NOTE — PROGRESS NOTES
ORTHOPAEDIC LUMBAR FUSION PROGRESS NOTE    NAME:     Bridgett Mcpherson   :       1940   MRN:       [id number]   DATE:      2022    POD:    2 Days Post-Op  S/P:    Procedure(s):  L4 - L5 LAMINECTOMY, FUSION, INSTRUMENTATION, TLIF, AND BONE GRAFT (LATEX ALLERGY)    SUBJECTIVE:    C/O back pain along surgical incision  No leg pain or numbness  Denies nausea/vomiting, headache, chest pain or shortness of breath  Pain controlled    Recent Labs     22  0350   HGB 8.9*      K 3.2*      CO2 28   BUN 10   CREA 0.61   *     Patient Vitals for the past 12 hrs:   BP Temp Pulse Resp SpO2   22 0805 (!) 150/70 98.6 °F (37 °C) 99 18 95 %   22 0402 131/65 98.7 °F (37.1 °C) 100 16 94 %   22 0036 132/66 99.8 °F (37.7 °C) 85 16 98 %       EXAM:  Dressings clean, dry and intact   Positive strength/ROM bilat lower ext.   Neuro intact to sensation  Calves, soft & nontender  BL LEs NVID      PLAN:  Continue prn PO pain medications  Monitor hemovac drain output  PT/OT, OOB w/ assist  Tolerating diet      Cleo Joseph [facility]  Orthopaedic Surgery  Physician Assistant to Dr. Arlyn Estrada

## 2022-07-27 NOTE — PROGRESS NOTES
Problem: Falls - Risk of  Goal: *Absence of Falls  Description: Document Leafy Salter Fall Risk and appropriate interventions in the flowsheet.   Outcome: Progressing Towards Goal  Note: Fall Risk Interventions:  Mobility Interventions: Bed/chair exit alarm, Patient to call before getting OOB, Utilize walker, cane, or other assistive device         Medication Interventions: Bed/chair exit alarm, Patient to call before getting OOB, Teach patient to arise slowly    Elimination Interventions: Bed/chair exit alarm, Call light in reach, Patient to call for help with toileting needs              Problem: Patient Education: Go to Patient Education Activity  Goal: Patient/Family Education  Outcome: Progressing Towards Goal     Problem: Patient Education: Go to Patient Education Activity  Goal: Patient/Family Education  Outcome: Progressing Towards Goal     Problem: Patient Education: Go to Patient Education Activity  Goal: Patient/Family Education  Outcome: Progressing Towards Goal     Problem: Simple Spine Procedure:  Post Op Day 1/Day of Discharge  Goal: Off Pathway (Use only if patient is Off Pathway)  Outcome: Progressing Towards Goal  Goal: Activity/Safety  Outcome: Progressing Towards Goal  Goal: Nutrition/Diet  Outcome: Progressing Towards Goal  Goal: Discharge Planning  Outcome: Progressing Towards Goal  Goal: Medications  Outcome: Progressing Towards Goal  Goal: Respiratory  Outcome: Progressing Towards Goal  Goal: Treatments/Interventions/Procedures  Outcome: Progressing Towards Goal  Goal: Psychosocial  Outcome: Progressing Towards Goal

## 2022-07-27 NOTE — PROGRESS NOTES
Problem: Self Care Deficits Care Plan (Adult)  Goal: *Acute Goals and Plan of Care (Insert Text)  Description: FUNCTIONAL STATUS PRIOR TO ADMISSION: Patient was independent and active without use of DME.     HOME SUPPORT: The patient lived alone with friends and daughters available to assist.    Occupational Therapy Goals  Initiated 7/26/2022    1. Patient will perform lower body dressing with supervision/set-up using AE PRN within 7 days. 2.  Patient will perform toilet transfer with supervision/set-up using most appropriate DME within 7 days. 3.  Patient will grooming, standing at sink, at supervision/set-up within 7 days. 4.  Patient will don/doff back brace at supervision/set-up within 7 days. 5.  Patient will verbalize/demonstrate 3/3 back precautions during ADL tasks without cues within 7 days. Outcome: Progressing Towards Goal   OCCUPATIONAL THERAPY TREATMENT  Patient: Jill Edward (34 y.o. female)  Date: 7/27/2022  Diagnosis: Spondylolisthesis of lumbar region [M43.16]  Spinal stenosis, lumbar region, with neurogenic claudication [M48.062]  Lumbar stenosis [M48.061] <principal problem not specified>  Procedure(s) (LRB):  L4 - L5 LAMINECTOMY, FUSION, INSTRUMENTATION, TLIF, AND BONE GRAFT (LATEX ALLERGY) (N/A) 2 Days Post-Op  Precautions: Fall, Back  Chart, occupational therapy assessment, plan of care, and goals were reviewed. ASSESSMENT  Patient continues with skilled OT services and is progressing towards goals. Pt seen for ADL re-training, ambulation to restroom and transfers to commode contact guard. Pt able to manage hr own hygiene. She stands at sink to wash her hands and dons brace after set-up. Pt returns to sit in chair and set-up to use bath cloths and doff/don gown. Pt has reacher and long handle sponge at home she uses, she does not wear socks and has slip on shoes.       Current Level of Function Impacting Discharge (ADLs): set-up to use bath cloths and to doff/don gown    Other factors to consider for discharge:          PLAN :  Patient continues to benefit from skilled intervention to address the above impairments. Continue treatment per established plan of care to address goals. Recommend with staff: out of bed for ADL's, there ex, there act, meals    Recommend next OT session: cont towards goals    Recommendation for discharge: (in order for the patient to meet his/her long term goals)  Occupational therapy at least 2 days/week in the home     This discharge recommendation:  Has not yet been discussed the attending provider and/or case management    IF patient discharges home will need the following DME:        SUBJECTIVE:   Patient stated I have a reacher which I use all the time.     OBJECTIVE DATA SUMMARY:   Cognitive/Behavioral Status:  Neurologic State: Alert  Orientation Level: Oriented X4  Cognition: Follows commands             Functional Mobility and Transfers for ADLs:  Bed Mobility:  Supine to Sit: Contact guard assistance    Transfers:  Sit to Stand: Contact guard assistance  Functional Transfers  Toilet Transfer : Contact guard assistance  Adaptive Equipment: Grab bars; Walker (comment)  Bed to Chair: Contact guard assistance    Balance: Intact sitting balance       ADL Intervention:       Grooming  Grooming Assistance: Contact guard assistance  Position Performed: Standing  Washing Hands: Contact guard assistance         Type of Bath: Chlorhexidine (CHG)         Upper Body Dressing Assistance  Orthotics(Brace): Set-up         Activity Tolerance:   Good    After treatment patient left in no apparent distress:   Sitting in chair and Call bell within reach    COMMUNICATION/COLLABORATION:   The patients plan of care was discussed with: Physical therapy assistant, Occupational therapist, and Registered nurse.      DARON Neves  Time Calculation: 17 mins

## 2022-07-27 NOTE — PROGRESS NOTES
Problem: Mobility Impaired (Adult and Pediatric)  Goal: *Acute Goals and Plan of Care (Insert Text)  Description: FUNCTIONAL STATUS PRIOR TO ADMISSION: Patient was independent and active without use of DME. Occasional use of SPC. HOME SUPPORT PRIOR TO ADMISSION: The patient lived alone with friends and daughter to provide assistance upon d/c home. Physical Therapy Goals  Initiated 7/26/2022    1. Patient will move from supine to sit and sit to supine , scoot up and down, and roll side to side in bed with independence within 4 days. 2. Patient will perform sit to stand with modified independence within 4 days. 3. Patient will ambulate with modified independence for 300 feet with the least restrictive device within 4 days. 4. Patient will ascend/descend 3 stairs with 2 handrail(s) with modified independence within 4 days. 5. Patient will verbalize and demonstrate understanding of spinal precautions (No bending, lifting greater than 5 lbs, or twisting; log-roll technique; frequent repositioning as instructed) within 4 days. Note:   PHYSICAL THERAPY TREATMENT  Patient: Justin Garrett (21 y.o. female)  Date: 7/27/2022  Diagnosis: Spondylolisthesis of lumbar region [M43.16]  Spinal stenosis, lumbar region, with neurogenic claudication [M48.062]  Lumbar stenosis [M48.061] <principal problem not specified>  Procedure(s) (LRB):  L4 - L5 LAMINECTOMY, FUSION, INSTRUMENTATION, TLIF, AND BONE GRAFT (LATEX ALLERGY) (N/A) 2 Days Post-Op  Precautions: Fall, Back  Chart, physical therapy assessment, plan of care and goals were reviewed. ASSESSMENT  Patient continues with skilled PT services. Pt supine to sit with CGA to min assist.Pt donned back brace with min assist.Pt CGA sit to stand. Pt ambulated 130ft with RW CGA. Pt was assisted in restroom before sitting up in chair. Pt progressing slowly. Continue goals. PLAN :  Patient continues to benefit from skilled intervention to address the above impairments. Continue treatment per established plan of care. to address goals.     Recommendation for discharge: (in order for the patient to meet his/her long term goals)  Physical therapy at least 2 days/week in the home     This discharge recommendation:  Has been made in collaboration with the attending provider and/or case management    IF patient discharges home will need the following DME: rolling walker       SUBJECTIVE:       OBJECTIVE DATA SUMMARY:   Critical Behavior:  Neurologic State: Alert  Orientation Level: Oriented X4  Cognition: Follows commands     Functional Mobility Training:  Bed Mobility:     Supine to Sit: Contact guard assistance to min  assist              Transfers:  Sit to Stand: Contact guard assistance  Stand to Sit: Contact guard assistance                   Balance:     Ambulation/Gait Training:      Pt ambulated 140ft with RW CGA      Activity Tolerance:   Good    After treatment patient left in no apparent distress:   Sitting in chair    COMMUNICATION/COLLABORATION:   The patients plan of care was discussed with: Physical therapist.     Eliza Weiss PTA   Time Calculation: 38 mins

## 2022-07-27 NOTE — PROGRESS NOTES
Problem: Simple Spine Procedure:  Post Op Day 1/Day of Discharge  Goal: Activity/Safety  Outcome: Progressing Towards Goal  Goal: Nutrition/Diet  Outcome: Progressing Towards Goal  Goal: Discharge Planning  Outcome: Progressing Towards Goal  Goal: Medications  Outcome: Progressing Towards Goal  Goal: Respiratory  Outcome: Progressing Towards Goal  Goal: Treatments/Interventions/Procedures  Outcome: Progressing Towards Goal  Goal: Psychosocial  Outcome: Progressing Towards Goal

## 2022-07-27 NOTE — PROGRESS NOTES
Transition of Care Plan - RUR 5%:       S/P L4-L5 Laminectomy, 7/25  CM following - patient lives alone but will have assistance from family and friends post d/c   PT/OT following   Home with family assistance and HH provided by At [address] when medically stable for d/c  Outpatient follow-up  Patient' friend will transport at d/c    Sachin Alegria LCSW

## 2022-07-27 NOTE — PROGRESS NOTES
Bedside and Verbal shift change report given to Mary RN (oncoming nurse) by [address] [place] [address] (offgoing nurse). Report included the following information SBAR, Kardex, Intake/Output, and Accordion.

## 2022-07-28 VITALS
TEMPERATURE: 97.8 F | HEART RATE: 79 BPM | WEIGHT: 145.5 LBS | DIASTOLIC BLOOD PRESSURE: 70 MMHG | RESPIRATION RATE: 18 BRPM | HEIGHT: 62 IN | SYSTOLIC BLOOD PRESSURE: 119 MMHG | BODY MASS INDEX: 26.78 KG/M2 | OXYGEN SATURATION: 99 %

## 2022-07-28 LAB
ANION GAP SERPL CALC-SCNC: 5 MMOL/L (ref 5–15)
BUN SERPL-MCNC: 12 MG/DL (ref 6–20)
BUN/CREAT SERPL: 19 (ref 12–20)
CALCIUM SERPL-MCNC: 8.9 MG/DL (ref 8.5–10.1)
CHLORIDE SERPL-SCNC: 103 MMOL/L (ref 97–108)
CO2 SERPL-SCNC: 30 MMOL/L (ref 21–32)
CREAT SERPL-MCNC: 0.63 MG/DL (ref 0.55–1.02)
GLUCOSE SERPL-MCNC: 115 MG/DL (ref 65–100)
HGB BLD-MCNC: 9.2 G/DL (ref 11.5–16)
POTASSIUM SERPL-SCNC: 3.9 MMOL/L (ref 3.5–5.1)
SODIUM SERPL-SCNC: 138 MMOL/L (ref 136–145)

## 2022-07-28 PROCEDURE — 97535 SELF CARE MNGMENT TRAINING: CPT

## 2022-07-28 PROCEDURE — 74011250637 HC RX REV CODE- 250/637: Performed by: ORTHOPAEDIC SURGERY

## 2022-07-28 PROCEDURE — 74011250637 HC RX REV CODE- 250/637: Performed by: NURSE PRACTITIONER

## 2022-07-28 PROCEDURE — 94761 N-INVAS EAR/PLS OXIMETRY MLT: CPT

## 2022-07-28 PROCEDURE — 74011000250 HC RX REV CODE- 250: Performed by: ORTHOPAEDIC SURGERY

## 2022-07-28 PROCEDURE — 36415 COLL VENOUS BLD VENIPUNCTURE: CPT

## 2022-07-28 PROCEDURE — 97116 GAIT TRAINING THERAPY: CPT

## 2022-07-28 PROCEDURE — 97530 THERAPEUTIC ACTIVITIES: CPT

## 2022-07-28 PROCEDURE — 85018 HEMOGLOBIN: CPT

## 2022-07-28 PROCEDURE — 80048 BASIC METABOLIC PNL TOTAL CA: CPT

## 2022-07-28 RX ORDER — OXYCODONE HYDROCHLORIDE 5 MG/1
5 TABLET ORAL
Qty: 40 TABLET | Refills: 0 | Status: SHIPPED | OUTPATIENT
Start: 2022-07-28 | End: 2022-08-04

## 2022-07-28 RX ORDER — DOCUSATE SODIUM 100 MG/1
100 CAPSULE, LIQUID FILLED ORAL 2 TIMES DAILY
Qty: 60 CAPSULE | Refills: 0 | Status: SHIPPED | OUTPATIENT
Start: 2022-07-28

## 2022-07-28 RX ADMIN — FAMOTIDINE 20 MG: 20 TABLET, FILM COATED ORAL at 09:00

## 2022-07-28 RX ADMIN — HYDROCHLOROTHIAZIDE: 25 TABLET ORAL at 09:00

## 2022-07-28 RX ADMIN — ACETAMINOPHEN 650 MG: 325 TABLET ORAL at 06:11

## 2022-07-28 RX ADMIN — OXYCODONE 5 MG: 5 TABLET ORAL at 14:44

## 2022-07-28 RX ADMIN — OXYCODONE 5 MG: 5 TABLET ORAL at 09:05

## 2022-07-28 RX ADMIN — LEVOTHYROXINE SODIUM 112 MCG: 0.11 TABLET ORAL at 06:48

## 2022-07-28 RX ADMIN — POLYETHYLENE GLYCOL 3350 17 G: 17 POWDER, FOR SOLUTION ORAL at 09:00

## 2022-07-28 RX ADMIN — DOCUSATE SODIUM 50MG AND SENNOSIDES 8.6MG 1 TABLET: 8.6; 5 TABLET, FILM COATED ORAL at 09:01

## 2022-07-28 RX ADMIN — OXYCODONE 5 MG: 5 TABLET ORAL at 03:48

## 2022-07-28 RX ADMIN — POTASSIUM BICARBONATE 40 MEQ: 782 TABLET, EFFERVESCENT ORAL at 09:01

## 2022-07-28 RX ADMIN — OXYCODONE 5 MG: 5 TABLET ORAL at 00:25

## 2022-07-28 RX ADMIN — SODIUM CHLORIDE, PRESERVATIVE FREE 10 ML: 5 INJECTION INTRAVENOUS at 06:11

## 2022-07-28 NOTE — PROGRESS NOTES
Problem: Falls - Risk of  Goal: *Absence of Falls  Description: Document Sera Christian Fall Risk and appropriate interventions in the flowsheet.   Outcome: Progressing Towards Goal  Note: Fall Risk Interventions:  Mobility Interventions: Bed/chair exit alarm, Patient to call before getting OOB, Utilize walker, cane, or other assistive device         Medication Interventions: Bed/chair exit alarm    Elimination Interventions: Bed/chair exit alarm

## 2022-07-28 NOTE — FACE TO FACE
Leader rounding and   Rounded on patient, f/u from Spine Pre-op Patient Education Class. Patient states class information was valuable in preparing for surgery. Patient states their home space is prepared and safe. Reviewed incentive spirometry use   Call, don't fall expectations reviewed with patient  Opportunity provided for patient to ask questions and provide comments. Questions answered.

## 2022-07-28 NOTE — PROGRESS NOTES
Ortho Spine    MsBibi Mccarthy has cleared therapy and is medically stable. HV drain put out 30mL past shift and can be removed. Pain controlled. Discussed case with LUIS ANGEL Mathias. Ok to d/c home today. Orders placed.      Dorothy Soto NP

## 2022-07-28 NOTE — PROGRESS NOTES
Medicare pt has received, reviewed, and signed 2nd IM letter informing them of their right to appeal the discharge. Signed copy has been placed on pt bedside chart.   Chip Minaya CMS

## 2022-07-28 NOTE — PROGRESS NOTES
1:25 PM  Care Management Progress Note  POD:    3 Days Post-Op  S/P:      Procedure(s):  L4 - L5 LAMINECTOMY, FUSION, INSTRUMENTATION, TLIF, AND BONE GRAFT (LATEX ALLERGY)     RUR:  4%  Risk Level: [x]Low []Moderate []High  Value-based purchasing: [] Yes [x] No  Bundle patient: [] Yes [x] No   Specify:     Transition of care plan:  Ongoing management by Ortho   Home with family assistance and home health through [facility], [facility] was sent pt's discharge clinicals and notified of pt's discharge home today via [employer]. Outpatient follow-up. Pt's family to transport      Care Management Interventions  PCP Verified by CM:  Yes  Transition of Care Consult (CM Consult): Home Health, Discharge  Taryn  [address] [address]: No  Reason Outside [place]: Patient already serviced by other home care/hospice agency  Physical Therapy Consult: Yes  Occupational Therapy Consult: Yes  Support Systems: Child(emery), Friend/Neighbor  Confirm Follow Up Transport: Friends  The Patient and/or Patient Representative was Provided with a Choice of Provider and Agrees with the Discharge Plan?: Yes  Freedom of Choice List was Provided with Basic Dialogue that Supports the Patient's Individualized Plan of Care/Goals, Treatment Preferences and Shares the Quality Data Associated with the Providers?: Yes  Discharge Location  Patient Expects to be Discharged to[de-identified] Home with home health

## 2022-07-28 NOTE — PROGRESS NOTES
.I have reviewed discharge instructions with the patient and daughter. The patient and daughter verbalized understanding.

## 2022-07-28 NOTE — PROGRESS NOTES
ORTHOPAEDIC LUMBAR FUSION PROGRESS NOTE    NAME:     Elise Farooq   :       1940   MRN:       [id number]   DATE:      2022    POD:    3 Days Post-Op  S/P:    Procedure(s):  L4 - L5 LAMINECTOMY, FUSION, INSTRUMENTATION, TLIF, AND BONE GRAFT (LATEX ALLERGY)    SUBJECTIVE:    C/O back pain along surgical incision  No leg pain or numbness  Denies nausea/vomiting, headache, chest pain or shortness of breath      Recent Labs     22  0232   HGB 9.2*      K 3.9      CO2 30   BUN 12   CREA 0.63   *     Patient Vitals for the past 12 hrs:   BP Temp Pulse Resp SpO2   22 0743 113/66 98.3 °F (36.8 °C) 95 18 97 %   22 0400 125/74 98 °F (36.7 °C) 92 18 96 %   22 2255 136/72 98.3 °F (36.8 °C) 95 18 95 %       EXAM:  Dressings clean, dry and intact   Positive strength/ROM bilat lower ext.   Neuro intact to sensation  Calves, soft & nontender  BL LEs NVID      PLAN:  Continue prn PO pain medications  Monitor hemovac drain output  PT/OT, OOB w/ assist  Tolerating diet      Brian Preciadoma  Orthopaedic Surgery  Physician Assistant to Dr. Catrachito Greer

## 2022-07-28 NOTE — PROGRESS NOTES
.Bedside shift change report given to Farrah (oncoming nurse) by Abril Hobson (offgoing nurse). Report included the following information SBAR.

## 2022-07-28 NOTE — PROGRESS NOTES
Problem: Mobility Impaired (Adult and Pediatric)  Goal: *Acute Goals and Plan of Care (Insert Text)  Description: FUNCTIONAL STATUS PRIOR TO ADMISSION: Patient was independent and active without use of DME. Occasional use of SPC. HOME SUPPORT PRIOR TO ADMISSION: The patient lived alone with friends and daughter to provide assistance upon d/c home. Physical Therapy Goals  Initiated 7/26/2022    1. Patient will move from supine to sit and sit to supine , scoot up and down, and roll side to side in bed with independence within 4 days. 2. Patient will perform sit to stand with modified independence within 4 days. 3. Patient will ambulate with modified independence for 300 feet with the least restrictive device within 4 days. 4. Patient will ascend/descend 3 stairs with 2 handrail(s) with modified independence within 4 days. 5. Patient will verbalize and demonstrate understanding of spinal precautions (No bending, lifting greater than 5 lbs, or twisting; log-roll technique; frequent repositioning as instructed) within 4 days. 7/28/2022 1157 by Shayla Wilkes, PT  Outcome: Progressing Towards Goal  7/28/2022 0942 by Shayla Wilkes, PT  Outcome: Progressing Towards Goal   PHYSICAL THERAPY TREATMENT  Patient: Lawrence Christianson (49 y.o. female)  Date: 7/28/2022  Diagnosis: Spondylolisthesis of lumbar region [M43.16]  Spinal stenosis, lumbar region, with neurogenic claudication [M48.062]  Lumbar stenosis [M48.061] <principal problem not specified>  Procedure(s) (LRB):  L4 - L5 LAMINECTOMY, FUSION, INSTRUMENTATION, TLIF, AND BONE GRAFT (LATEX ALLERGY) (N/A) 3 Days Post-Op  Precautions: Fall, Back No bending, no lifting greater than 5 lbs, no twisting, log-roll technique, repositioning every 20-30 min except when sleeping, brace when OOB (if ordered)  Chart, physical therapy assessment, plan of care and goals were reviewed.     ASSESSMENT  Patient continues with skilled PT services and is progressing towards goals. Patient this afternoon with good tolerance and participation in physical therapy session emphasizing gait training for home oxygen assessment. Patient this afternoon without any desaturation during 250ft ambulation with RW and SBA. She denies chest pain, palpitations, shortness of breath. She is stable on room air in high 90s with heart rate 80-89 bpm with activity. She remains clear for d/c from PT perspective. Pulse oximetry assessment   100% at rest on room air (if 88% or less, skip next steps)  97% while ambulating on room air  91-92% upon sitting on room air  100% at rest on room air at conclusion of session  . Current Level of Function Impacting Discharge (mobility/balance): SBA    Other factors to consider for discharge: none additional         PLAN :  Patient continues to benefit from skilled intervention to address the above impairments. Continue treatment per established plan of care. to address goals. Recommendation for discharge: (in order for the patient to meet his/her long term goals)  Physical therapy at least 2 days/week in the home     This discharge recommendation:  Has not yet been discussed the attending provider and/or case management    IF patient discharges home will need the following DME: patient owns DME required for discharge       SUBJECTIVE:   Patient stated oh good.     OBJECTIVE DATA SUMMARY:   Patient received seated in bedside chair, agreeable to participate in PT session. Patient was cleared by nursing to participate in PT session. Critical Behavior:  Neurologic State: Alert  Orientation Level: Oriented X4  Cognition: Follows commands, Appropriate for age attention/concentration, Appropriate decision making       Spinal diagnosis intervention:  The patient required no verbal cues to maintain back precautions during functional activity.      Functional Mobility Training:    Bed Mobility:  Log Rolling: Supervision  Supine to Sit: Stand-by assistance Transfers:  Sit to Stand: Stand-by assistance  Stand to Sit: Stand-by assistance        Bed to Chair: Stand-by assistance                    Balance:  Sitting: Intact  Standing: Intact; With support  Ambulation/Gait Training:  Distance (ft): 250 Feet (ft)  Assistive Device: Brace/Splint;Gait belt;Walker, rolling  Ambulation - Level of Assistance: Stand-by assistance        Gait Abnormalities: Antalgic        Base of Support: Narrowed     Speed/Kylie: Pace decreased (<100 feet/min)  Step Length: Right shortened;Left shortened                  Therapeutic Exercises:   Reviewed incentive spirometer use   Pain Rating:  Patient without reports of pain during therapy      Activity Tolerance:   Good, tolerates ADLs without rest breaks, and SpO2 stable on RA    After treatment patient left in no apparent distress:   Sitting in chair, Call bell within reach, and Bed / chair alarm activated    COMMUNICATION/COLLABORATION:   The patients plan of care was discussed with: Registered nurse and ortho NP .      Ankur Shukla PT, DPT   Time Calculation: 11 mins

## 2022-07-28 NOTE — DISCHARGE INSTRUCTIONS
Lumbar Spinal Surgery Discharge Instructions   Dr. Dilia Ferro  [phone]    Activity:    You are going home a well person, be as active as possible. Your only exercise should be walking. Start with short frequent walks and increase your walking distance each day. Start with walking twice a day for 5 minutes and increase your distance each day 2-3 minutes until you reach 25 minutes twice a day. Limit the amount of time you sit to 20-30 minute intervals. Sitting for prolonged periods of time will be uncomfortable for you following your surgery. No bending, lifting (of 5lbs or more), twisting, or straining. Do not drive until your doctor states you may do so. However, you may ride in a car for short distances. If you are required to wear a brace, you should wear it at all times when you are out of bed. You may remove it when sleeping unless your physician advises you against it. When you are in the bed, you may lay on your back or on either side. Do not lie on your stomach. You may resume sexual relations 3-4 weeks after surgery depending on how you are feeling. Continue to use your incentive spirometer for deep breathing exercises. Driving: You may not drive or return to work until instructed by your physician. However, you may ride in the car for short periods of time. Brace: If you have a back brace, you should wear your brace at all times when you are out of bed. Do not wear the brace while in bed or showering. Remember to always wear a cotton t-shirt underneath your brace. Do not bend or twist when your brace is off. Diet:  You may resume your regular home diet as tolerated. If your throat is sore, you should eat soft foods for the first couple of days. Be sure to drink plenty of fluids; it is important to keep yourself hydrated. Avoid alcoholic beverages and ABSOLUTELY NO tobacco products. Tobacco products will interfere with your healing.  If you continue to use tobacco, you may end up needing another surgery in the future. Dressing: You have on a Prineo dressing. This is a waterproof bacteriostatic dressing that  requires no post-operative care. Please do not peel the dressing off, or apply any  oil based products, as they may expedite the deterioration of the mesh. The  dressing will slowly chip off on its own. Do not rub or apply lotion or ointments to incision site. Shower: You may shower 5 days after your surgery. You may remove your brace during showers. NO not use tub baths, swimming pools or Jacuzzis. Medications:  **Your prescriptions have been e-scribed to the pharmacy on file at Dr. Allen Jin office. **  Check with your physician before taking any anti-inflammatory medications. (Advil, Aleve, Ibuprofen, Aspirin)  Take your pain medication as directed  Do NOT take additional Tylenol if your prescribed pain medication has acetaminophen in it (Endocet/Percocet, Lortab, Norco)  It is important to have regular bowel movements. Pain medications can be constipating. Stool softeners, warm prune juice, increasing your water intake, and increasing fiber in your diet can help in preventing constipation. Do NOT take laxatives if at all possible except in severe situations. It can result in a vicious cycle of constipation and diarrhea. *** VERY IMPORTANT - PLEASE READ*** Please monitor the supply of your pain medicine closely and if it looks like you're going to run out of pain medicine over the weekend please call the office on [address] prior to the weekend to request a refill. The on call physician for nights and weekends CANNOT refill pain medicine. You will either have to wait until Monday morning when the office re-opens or you will have to go to an urgent care/ emergency room if you are in need of pain medicine. Follow-Up   Please call ASAP to schedule your 1st post-operative appointment.  This should be approximately 3 weeks from your surgery date. Notify your physician in you develop any of the following conditions:  Fever above 101 degrees for 24 hours. Nausea or vomiting. Severe headache. Inability to urinate  Loss of bowel or bladder function (sudden onset of incontinence)  Changes in sensation in your extremities (numbness, tingling, loss of color). Severe pain or pain not relieved by medications. Redness, swelling, or drainage from your incision. Persistent pain in the chest.   Pain in the calf of either leg. Increased weakness (if this is greater than before your surgery). If you have any questions about your dressing contact your Orthopaedic Surgeons office. OFFICE OF DR. Dinorah Jones   [phone]  OUR NEW ADDRESS IS [address], [facility] [address] [facility]     ** IMPORTANT: UNDER YOUR HONEYCOMB DRESSING, YOU HAVE A PRINEO ADHESIVE MESH DIRECTLY OVER YOUR INCISION. THIS MESH WAS STERILELY GLUED TO YOUR SKIN DURING SURGERY. DO NOT REMOVE THIS. NO ONE ELSE SHOULD REMOVE IT EITHER. IT WILL COME OFF ON ITS OWN OVER TIME    YOUR HONEYCOMB DRESSING NEEDS TO BE REMOVED PRIOR TO SHOWERING. THEN THE PRINEO MESH CAN BE LEFT OPEN TO AIR    * WEAR YOUR BRACE AS ADVISED    * NO DRIVING UNTIL YOU ARE CLEARED TO DO SO BY YOUR SURGEON    * LIMIT LIFTING, BENDING AND TWISTING.  NO LIFTING MORE THAN 5 LBS    * MAKE SURE YOU ARE GETTING GOOD NUTRITION (Lean Protein, Vitamin D AND Calcium)    * DO NOT TAKE ANY NSAIDs FOR THE FIRST 3 MONTHS AFTER SURGERY (such as Advil/Ibuprofen/Motrin, Aleve/Naproxen/Naprosyn, Diclofenac, Celebrex, Meloxicam, Indomethacin, Goody's powder, BC powder etc.)    * NO NICOTINE PRODUCTS    * FULLY READ YOUR DISCHARGE INSTRUCTIONS

## 2022-07-28 NOTE — PROGRESS NOTES
Problem: Self Care Deficits Care Plan (Adult)  Goal: *Acute Goals and Plan of Care (Insert Text)  Description: FUNCTIONAL STATUS PRIOR TO ADMISSION: Patient was independent and active without use of DME.     HOME SUPPORT: The patient lived alone with friends and daughters available to assist.    Occupational Therapy Goals  Initiated 7/26/2022    1. Patient will perform lower body dressing with supervision/set-up using AE PRN within 7 days. 2.  Patient will perform toilet transfer with supervision/set-up using most appropriate DME within 7 days. 3.  Patient will grooming, standing at sink, at supervision/set-up within 7 days. 4.  Patient will don/doff back brace at supervision/set-up within 7 days. 5.  Patient will verbalize/demonstrate 3/3 back precautions during ADL tasks without cues within 7 days. Outcome: Progressing Towards Goal   OCCUPATIONAL THERAPY TREATMENT  Patient: Roc Dolan (33 y.o. female)  Date: 7/28/2022  Diagnosis: Spondylolisthesis of lumbar region [M43.16]  Spinal stenosis, lumbar region, with neurogenic claudication [M48.062]  Lumbar stenosis [M48.061] <principal problem not specified>  Procedure(s) (LRB):  L4 - L5 LAMINECTOMY, FUSION, INSTRUMENTATION, TLIF, AND BONE GRAFT (LATEX ALLERGY) (N/A) 3 Days Post-Op  Precautions: Fall, Back  Chart, occupational therapy assessment, plan of care, and goals were reviewed. ASSESSMENT  Patient continues with skilled OT services and is progressing towards goals. Pt seated in chair, daughter present for tx. Pt educated as to AE for bathing and dressing. Pt able to manage her own socks with use of sock aide. Pts O2 sats 98% with activity. Pt is stand by assist for ADL related mobility. At this time pt is clear from an OT standpoint. Current Level of Function Impacting Discharge (ADLs):  Mod I LB dress with use of AE    Other factors to consider for discharge:          PLAN :  Patient continues to benefit from skilled intervention to address the above impairments. Continue treatment per established plan of care to address goals. Recommend with staff: out of bed for ADL's, there ex, there act, meals    Recommend next OT session: cont towards goals    Recommendation for discharge: (in order for the patient to meet his/her long term goals)  Occupational therapy at least 2 days/week in the home     This discharge recommendation:  Has not yet been discussed the attending provider and/or case management    IF patient discharges home will need the following DME:       SUBJECTIVE:   Patient stated I like this.  re: sock aide    OBJECTIVE DATA SUMMARY:   Cognitive/Behavioral Status:  Neurologic State: Alert  Orientation Level: Oriented X4  Cognition: Follows commands; Appropriate for age attention/concentration; Appropriate decision making             Functional Mobility and Transfers for ADLs:  Bed Mobility:  Rolling: Supervision  Supine to Sit: Stand-by assistance    Transfers:  Sit to Stand: Stand-by assistance     Bed to Chair: Stand-by assistance    Balance:  Sitting: Intact  Standing: Intact; With support    ADL Intervention:       Lower Body Dressing Assistance  Socks: Modified independent  Leg Crossed Method Used: No  Position Performed: Seated in chair  Adaptive Equipment Used: Sock aid            Activity Tolerance:   Good    After treatment patient left in no apparent distress:   Sitting in chair    COMMUNICATION/COLLABORATION:   The patients plan of care was discussed with: Physical therapist, Occupational therapist, and Registered nurse.      ALLAN Neves/L  Time Calculation: 17 mins

## 2022-07-28 NOTE — PROGRESS NOTES
Problem: Mobility Impaired (Adult and Pediatric)  Goal: *Acute Goals and Plan of Care (Insert Text)  Description: FUNCTIONAL STATUS PRIOR TO ADMISSION: Patient was independent and active without use of DME. Occasional use of SPC. HOME SUPPORT PRIOR TO ADMISSION: The patient lived alone with friends and daughter to provide assistance upon d/c home. Physical Therapy Goals  Initiated 7/26/2022    1. Patient will move from supine to sit and sit to supine , scoot up and down, and roll side to side in bed with independence within 4 days. 2. Patient will perform sit to stand with modified independence within 4 days. 3. Patient will ambulate with modified independence for 300 feet with the least restrictive device within 4 days. 4. Patient will ascend/descend 3 stairs with 2 handrail(s) with modified independence within 4 days. 5. Patient will verbalize and demonstrate understanding of spinal precautions (No bending, lifting greater than 5 lbs, or twisting; log-roll technique; frequent repositioning as instructed) within 4 days. Outcome: Progressing Towards Goal   PHYSICAL THERAPY TREATMENT  Patient: Jose Luis Nelson (49 y.o. female)  Date: 7/28/2022  Diagnosis: Spondylolisthesis of lumbar region [M43.16]  Spinal stenosis, lumbar region, with neurogenic claudication [M48.062]  Lumbar stenosis [M48.061] <principal problem not specified>  Procedure(s) (LRB):  L4 - L5 LAMINECTOMY, FUSION, INSTRUMENTATION, TLIF, AND BONE GRAFT (LATEX ALLERGY) (N/A) 3 Days Post-Op  Precautions: Fall, Back No bending, no lifting greater than 5 lbs, no twisting, log-roll technique, repositioning every 20-30 min except when sleeping, brace when OOB (if ordered)  Chart, physical therapy assessment, plan of care and goals were reviewed. ASSESSMENT  Patient continues with skilled PT services and is progressing towards goals.  Patient this morning with good participation and fair tolerance to physical therapy session secondary to desaturation with exertion on room air. Per patient tele box, patient desaturating on stairs to 87% and then during ambulation 80% on room air with good pleth. Patient denies subjective feelings of shortness of breath, chest pain. Spo2 immediately recovered to 100% with seated rest. Mobility wise, patient tolerates 200ft ambulation with SBA and RW. She has narrowed and antalgic gait, but there is no loss of balance throughout. She ascends/descends 4 stairs with bernarda handrails to mimic the home environment. At this time patient is clear for d/c from PT perspective. PT will return later today for repeat monitoring of spo2 with activity. Alerted RN and ortho NP. Recommend HHPT upon d/c     Current Level of Function Impacting Discharge (mobility/balance): SBA    Other factors to consider for discharge: none additional         PLAN :  Patient continues to benefit from skilled intervention to address the above impairments. Continue treatment per established plan of care. to address goals. Recommendation for discharge: (in order for the patient to meet his/her long term goals)  Physical therapy at least 2 days/week in the home     This discharge recommendation:  Has not yet been discussed the attending provider and/or case management    IF patient discharges home will need the following DME: patient owns DME required for discharge       SUBJECTIVE:   Patient stated no I feel fine. Toyin Price   Re: asking patient for subjective feelings of chest pain, SOB  OBJECTIVE DATA SUMMARY:   Patient received supine in bed and was agreeable to participate in PT session. Patient was cleared by nursing to participate in PT session.    Patient's daughter present for PT treatment    Critical Behavior:  Neurologic State: Alert  Orientation Level: Oriented X4  Cognition: Follows commands, Appropriate for age attention/concentration, Appropriate decision making       Spinal diagnosis intervention:  The patient stated 2/3 back precautions when prompted. Reviewed all 3 back precautions, log roll technique, and sitting for 30 minutes at a time. The patient required minimal verbal cues to maintain back precautions during functional activity. Reviewed back brace application and wear schedule. Brace donned with supervision/set-up      Functional Mobility Training:    Bed Mobility:  Log Rolling: Supervision  Supine to Sit: Stand-by assistance              Transfers:  Sit to Stand: Stand-by assistance  Stand to Sit: Stand-by assistance        Bed to Chair: Stand-by assistance                    Balance:  Sitting: Intact  Standing: Intact; With support  Ambulation/Gait Training:  Distance (ft): 200 Feet (ft)  Assistive Device: Brace/Splint;Gait belt;Walker, rolling  Ambulation - Level of Assistance: Contact guard assistance;Stand-by assistance        Gait Abnormalities: Antalgic        Base of Support: Narrowed     Speed/Kylie: Pace decreased (<100 feet/min)  Step Length: Right shortened;Left shortened                    Stairs:  Number of Stairs Trained: 4  Stairs - Level of Assistance: Contact guard assistance   Rail Use: Both    Therapeutic Exercises:     Pain Rating:  Patient reports minimal pain to surgical site    Activity Tolerance:   Fair, desaturates with exertion and requires oxygen, and requires rest breaks    After treatment patient left in no apparent distress:   Sitting in chair, Call bell within reach, Bed / chair alarm activated, and Caregiver / family present    COMMUNICATION/COLLABORATION:   The patients plan of care was discussed with: Registered nurse and ortho NP .    Trung Padilal PT, DPT   Time Calculation: 23 mins

## 2022-07-28 NOTE — PROGRESS NOTES
[id number] Verified with AM shift nurse Mary via phone call,  he verbalized he gave patient Zoloft dose given at [phone].

## 2022-07-29 ENCOUNTER — PATIENT OUTREACH (OUTPATIENT)
Dept: CASE MANAGEMENT | Age: 82
End: 2022-07-29

## 2022-07-29 NOTE — PROGRESS NOTES
Care Transitions Initial Call    Call within 2 business days of discharge: Yes     Patient: Kathleen Strange Patient : 1940 MRN: [id number]    Last Discharge [address]       Date Complaint Diagnosis Description Type Department Provider    22  Acute post-operative pain Admission (Discharged) Nettie Wise MD            Was this an external facility discharge? No Discharge Facility: [facility] - Lumbar stenosis s/p laminectomy/fusion by Brie Taylor 272 to be reviewed by the provider   Additional needs identified to be addressed with provider: no  [facility] - Lumbar stenosis, s/p laminectomy/fusion         Method of communication with provider : chart routing    Discussed COVID-19 related testing which was not done at this time. Advance Care Planning:   Does patient have an Advance Directive: reviewed and current    Inpatient Readmission Risk score: Unplanned Readmit Risk Score: 4.2    Was this a readmission? no   Patient stated reason for the admission: scheduled back surgery    Patients top risk factors for readmission: medical condition-s/p lumbar laminectomy and fusion. H/o of easy bleeding, anxiety,HTN,hypothyroid. Interventions to address risk factors: Scheduled appointment with PCP-declined ESE with pcp for now, Scheduled appointment with Specialist--  or - did not have date book in front of her., and Obtained and reviewed discharge summary and/or continuity of care documents    Care Transition Nurse (CTN) contacted the patient by telephone to perform post hospital discharge assessment. Verified name and  with patient as identifiers. Provided introduction to self, and explanation of the CTN role. Spoke with patient, says she feels better today. Pain about #5- had taken most recent dose of Oxycodone IR 5mg about 3 1/2 hours before. Wearing her brace as instructed. Moving around more today. Daughter is a nurse and staying with her.  Has other family members also there to help with recuperation. HH to see her 7/30 for start of service. CTN reviewed discharge instructions, medical action plan and red flags with patient who verbalized understanding. Were discharge instructions available to patient? yes. Reviewed appropriate site of care based on symptoms and resources available to patient including: PCP, Specialist, Urgent [address], When to call 911, and Sociallt Messaging. Patient given an opportunity to ask questions and does not have any further questions or concerns at this time. The patient agrees to contact the PCP office for questions related to their healthcare. Medication reconciliation was performed with patient, who verbalizes understanding of administration of home medications. Advised obtaining a 90-day supply of all daily and as-needed medications. Referral to Pharm D needed: no     Home Health/Outpatient orders at discharge: home health care, PT, and Svbrandy [facility]: At [facility]  Date of initial visit: 7/30/22    Durable Medical Equipment ordered at discharge: None  (had walker at home)  [facility] [address] received: na    Was patient discharged with a pulse oximeter? no    Discussed follow-up appointments. If no appointment was previously scheduled, appointment scheduling offered: yes. Is follow up appointment scheduled within 7 days of discharge? no. Declined ESE with pcp; Will see ortho for 2 week f/u. [facility] follow up appointment(s): No future appointments. Non-[facility] follow up appointment(s): Alexandro Sandifer, 8/17 or 8/22 (she did not have her date book in front of her at time of this call)    Plan for follow-up call in 5-7 days based on severity of symptoms and risk factors.   Plan for next call: symptom management-assess current symptoms, self management-following discharge instructions, follow up appointment-saw ortho for f/u , and medication management-taking meds as ordered. CTN provided contact information for future needs. Goals Addressed                   This Visit's Progress     Understands red flags post discharge. 7/29/22 [facility] 7/25-7/28 Lumbar stenosis s/p lumbar laminectomy/fusion  Reviewed discharge instructions with patient using teach back. Reviewed meds- education provided on new meds, using teach back. Reviewed red flags: increased pain not managed by pain med, sob, chest pain, fever,nausea,vomiting, diarrhea, signs of infection at incision site: oozing,tender,swollen,red; signs of DVT in leg- pain in calf of either leg. F/u with ortho- 8/17, . At [address] to start tomorrow, 7/30. Declined ESE with pcp for now. [facility] CTN provided contact info if questions/concerns.   CTN to check back in about a week, sooner prn.[email or web address]

## 2022-08-05 ENCOUNTER — PATIENT OUTREACH (OUTPATIENT)
Dept: CASE MANAGEMENT | Age: 82
End: 2022-08-05

## 2022-08-05 NOTE — PROGRESS NOTES
Called and spoke to patient. Goals Addressed                   This Visit's Progress     Understands red flags post discharge. 7/29/22 [facility] 7/25-7/28 Lumbar stenosis s/p lumbar laminectomy/fusion  Reviewed discharge instructions with patient using teach back. Reviewed meds- education provided on new meds, using teach back. Reviewed red flags: increased pain not managed by pain med, sob, chest pain, fever,nausea,vomiting, diarrhea, signs of infection at incision site: oozing,tender,swollen,red; signs of DVT in leg- pain in calf of either leg. F/u with ortho- 8/17, . At [address] to start tomorrow, 7/30. Declined ESE with pcp for now. [facility] CTN provided contact info if questions/concerns. CTN to check back in about a week, sooner prn.mbt  8/5/22  Reports she is doing very well. Pain only about #3-4. Aching pain, throbs at times. Trying to wean off of the Oxycodone. Has only taken about 1/2 tab today, later took a Tylenol. Says each day she feels stronger. PT is there with her now. Sees ortho on 8/17. No red flags noted. Advised continue current POC. CTN to check back in about a week. mbt

## 2022-08-07 NOTE — DISCHARGE SUMMARY
DISCHARGE SUMMARY     Patient: Dominic Matt Dr Record Number: [id number]                : 1940  Age: [de-identified] y.o. Admit Date: 2022  Discharge Date: 2022  Admission Diagnosis: Spondylolisthesis of lumbar region [M43.16]  Spinal stenosis, lumbar region, with neurogenic claudication [M48.062]  Lumbar stenosis [M48.061]  Discharge Diagnosis: Spondylolisthesis of lumbar region [M43.16]  Spinal stenosis, lumbar region, with neurogenic claudication [M48.062]  Procedures: Procedure(s):  L4 - L5 LAMINECTOMY, FUSION, INSTRUMENTATION, TLIF, AND BONE GRAFT (LATEX ALLERGY)  Surgeon: Estella Kraus MD  Co-surgeon:   Assistants:   Anesthesia: General  Complications: None     History of Present Illness:  Gabriel Mayes is a [de-identified] y.o. female with a history of intractable low back and radiculopathy. Despite conservative management and after clinical and radiographic evaluation, it was determined that she suffered from lumbar stenosis  and lumbar spondylolisthesis and would benefit from Procedure(s):  L4 - L5 LAMINECTOMY, FUSION, INSTRUMENTATION, TLIF, AND BONE GRAFT (LATEX ALLERGY), which she consented to undergo after a discussion of the risks, benefits, alternatives, rehab concerns, and potential complications of surgery. Hospital Course:  Gabriel Mayes tolerated the procedure well. She was transferred  to the recovery room in stable condition. After a brief stay, the patient was then transferred to the Orthopedic floor. On postoperative day #1, the dressing was clean and dry and she was neurovascularly intact. The patient was afebrile and vital signs were stable. Calves were soft and non-tender bilaterally. Gabriel Mayes made excellent progress with physical therapy and was discharged to Home with Home Health in stable condition on postoperative day 3.   She was provided with routine postoperative instructions and advised to follow up in my office in 3 weeks following discharge from the hospital.  She was given prescriptions for medication to control post-operative symptoms. Discharge Medications:  Discharge Medication List as of 7/28/2022  3:05 PM        START taking these medications    Details   oxyCODONE IR (ROXICODONE) 5 mg immediate release tablet Take 1 Tablet by mouth every four (4) hours as needed for Pain for up to 7 days. Max Daily Amount: 30 mg. Indications: pain, Acute Post op Pain, Normal, Disp-40 Tablet, R-0S/p Orthopaedic Surgery. Call [phone] with questions. docusate sodium (COLACE) 100 mg capsule Take 1 Capsule by mouth two (2) times a day., Normal, Disp-60 Capsule, R-0           CONTINUE these medications which have NOT CHANGED    Details   lisinopril-hydroCHLOROthiazide (PRINZIDE, ZESTORETIC) 20-25 mg per tablet Take 1 Tablet by mouth in the morning., Historical Med      rosuvastatin (CRESTOR) 20 mg tablet Take 20 mg by mouth nightly., Historical Med      acetaminophen (TYLENOL 8 HOUR PO) Take 2 Tablets by mouth as needed., Historical Med      multivitamin, tx-iron-ca-min (THERA-M w/ IRON) 9 mg iron-400 mcg tab tablet Take 1 Tablet by mouth every evening., Historical Med      TUMERIC-GING-OLIVE-OREG-CAPRYL PO Take 1,000 mg by mouth every evening., Historical Med      sertraline (ZOLOFT) 100 mg tablet Take 100 mg by mouth every evening., Historical Med      levothyroxine (SYNTHROID) 112 mcg tablet Take 112 mcg by mouth Daily (before breakfast). , Historical Med           STOP taking these medications       traMADoL (ULTRAM) 50 mg tablet Comments:   Reason for Stopping:               Eunice Rogers, [address]  7/28/2022  Orthopaedic Surgery  Physician Assistant to Dr. Bib Diego

## 2022-08-09 NOTE — PROGRESS NOTES
Physician Progress Note      PATIENT:               Marta Troy  CSN #:                  [id number]  :                       1940  ADMIT DATE:       2022 9:02 AM  DISCH DATE:        2022 4:05 PM  RESPONDING  PROVIDER #:        Anish Miguel NP          QUERY TEXT:    Good morning  Pt admitted with Spondylolisthesis of lumbar region. Pt noted to have decreasing Hgb. If possible, please document in the progress notes and discharge summary if you are evaluating and/or treating any of the following: The medical record reflects the following:  Risk Factors: Spondylolisthesis of lumbar region, Spinal stenosis, s/p L4 - L5 LAMINECTOMY  Clinical Indicators: Hgb-10.9>>>8.9>>>9.2  Treatment: daily lab monitoring    Thank you  Max Etienne RN CDI  [id number]  Options provided:  -- Acute blood loss anemia  -- Postoperative acute blood loss anemia  -- Anemia not present  -- Other - I will add my own diagnosis  -- Disagree - Not applicable / Not valid  -- Disagree - Clinically unable to determine / Unknown  -- Refer to Clinical Documentation Reviewer    PROVIDER RESPONSE TEXT:    This patient has postoperative acute blood loss anemia.     Query created by: Shahida Kinsey on 2022 10:19 AM      Electronically signed by:  Anish Miguel NP 2022 8:23 AM

## 2022-08-19 ENCOUNTER — PATIENT OUTREACH (OUTPATIENT)
Dept: CASE MANAGEMENT | Age: 82
End: 2022-08-19

## 2022-08-19 NOTE — PROGRESS NOTES
Called and spoke to patient. Goals Addressed                   This Visit's Progress     Understands red flags post discharge. 7/29/22 [facility] 7/25-7/28 Lumbar stenosis s/p lumbar laminectomy/fusion  Reviewed discharge instructions with patient using teach back. Reviewed meds- education provided on new meds, using teach back. Reviewed red flags: increased pain not managed by pain med, sob, chest pain, fever,nausea,vomiting, diarrhea, signs of infection at incision site: oozing,tender,swollen,red; signs of DVT in leg- pain in calf of either leg. F/u with ortho- 8/17, . At [address] to start tomorrow, 7/30. Declined ESE with pcp for now. [facility] CTN provided contact info if questions/concerns. CTN to check back in about a week, sooner prn.mbt  8/5/22  Reports she is doing very well. Pain only about #3-4. Aching pain, throbs at times. Trying to wean off of the Oxycodone. Has only taken about 1/2 tab today, later took a Tylenol. Says each day she feels stronger. PT is there with her now. Sees ortho on 8/17. No red flags noted. Advised continue current POC. CTN to check back in about a week. mbt  8/19/22  Reports she is feeling great! Had appt with 's NP on Wednesday- all looked very good per NP! Discomfort minimal, about #1-2 if stands for long period of time. Takes about 1/2 Oxycodone at night to help sleep. Suggested weaning off as able. No red flags noted. CTN to check back in about a week. mbt

## 2022-08-30 ENCOUNTER — PATIENT OUTREACH (OUTPATIENT)
Dept: CASE MANAGEMENT | Age: 82
End: 2022-08-30

## 2022-08-30 NOTE — PROGRESS NOTES
Patient has graduated from the Transitions of Care Coordination  program on 8/30/22. Patient/family has the ability to self-manage at this time Care management goals have been completed. Patient was not referred to the [facility] team for further management. Goals Addressed                   This Visit's Progress     COMPLETED: Understands red flags post discharge. 7/29/22 [facility] 7/25-7/28 Lumbar stenosis s/p lumbar laminectomy/fusion  Reviewed discharge instructions with patient using teach back. Reviewed meds- education provided on new meds, using teach back. Reviewed red flags: increased pain not managed by pain med, sob, chest pain, fever,nausea,vomiting, diarrhea, signs of infection at incision site: oozing,tender,swollen,red; signs of DVT in leg- pain in calf of either leg. F/u with ortho- 8/17, . At [address] [place] [address] to start tomorrow, 7/30. Declined ESE with pcp for now. [facility] CTN provided contact info if questions/concerns. CTN to check back in about a week, sooner prn.mbt  8/5/22  Reports she is doing very well. Pain only about #3-4. Aching pain, throbs at times. Trying to wean off of the Oxycodone. Has only taken about 1/2 tab today, later took a Tylenol. Says each day she feels stronger. PT is there with her now. Sees ortho on 8/17. No red flags noted. Advised continue current POC. CTN to check back in about a week. mbt  8/19/22  Reports she is feeling great! Had appt with 's NP on Wednesday- all looked very good per NP! Discomfort minimal, about #1-2 if stands for long period of time. Takes about 1/2 Oxycodone at night to help sleep. Suggested weaning off as able. No red flags noted. CTN to check back in about a week. mbt  8/30/22  Reports she is do really well. Stronger each day. Less pain, only using Tylenol prn. Continues to see Home PT. Sees ortho for next f/u in October. No red flags noted.  Advised continue current POC, wished her well.mbt                Patient has Care Transition Nurse's contact information for any further questions, concerns, or needs. Patients upcoming visits:  No future appointments.

## 2023-01-18 ENCOUNTER — HOSPITAL ENCOUNTER (OUTPATIENT)
Dept: PREADMISSION TESTING | Age: 83
Discharge: HOME | End: 2023-01-18
Attending: ORTHOPAEDIC SURGERY
Payer: MEDICARE

## 2023-01-18 VITALS
TEMPERATURE: 98.2 F | HEART RATE: 77 BPM | HEIGHT: 62 IN | RESPIRATION RATE: 20 BRPM | SYSTOLIC BLOOD PRESSURE: 160 MMHG | WEIGHT: 154.7 LBS | OXYGEN SATURATION: 98 % | BODY MASS INDEX: 28.47 KG/M2 | DIASTOLIC BLOOD PRESSURE: 80 MMHG

## 2023-01-18 LAB
ABO + RH BLD: NORMAL
ALBUMIN SERPL-MCNC: 4.4 G/DL (ref 3.5–5)
ALBUMIN/GLOB SERPL: 1.4 (ref 1.1–2.2)
ALP SERPL-CCNC: 82 U/L (ref 45–117)
ALT SERPL-CCNC: 24 U/L (ref 12–78)
ANION GAP SERPL CALC-SCNC: 6 MMOL/L (ref 5–15)
APPEARANCE UR: CLEAR
AST SERPL-CCNC: 23 U/L (ref 15–37)
BACTERIA URNS QL MICRO: NEGATIVE /HPF
BASOPHILS # BLD: 0.1 K/UL (ref 0–0.1)
BASOPHILS NFR BLD: 1 % (ref 0–1)
BILIRUB SERPL-MCNC: 0.3 MG/DL (ref 0.2–1)
BILIRUB UR QL: NEGATIVE
BLOOD GROUP ANTIBODIES SERPL: NORMAL
BUN SERPL-MCNC: 26 MG/DL (ref 6–20)
BUN/CREAT SERPL: 24 (ref 12–20)
CALCIUM SERPL-MCNC: 10 MG/DL (ref 8.5–10.1)
CHLORIDE SERPL-SCNC: 104 MMOL/L (ref 97–108)
CO2 SERPL-SCNC: 28 MMOL/L (ref 21–32)
COLOR UR: ABNORMAL
CREAT SERPL-MCNC: 1.09 MG/DL (ref 0.55–1.02)
DIFFERENTIAL METHOD BLD: NORMAL
EOSINOPHIL # BLD: 0.2 K/UL (ref 0–0.4)
EOSINOPHIL NFR BLD: 3 % (ref 0–7)
EPITH CASTS URNS QL MICRO: ABNORMAL /LPF
ERYTHROCYTE [DISTWIDTH] IN BLOOD BY AUTOMATED COUNT: 13.8 % (ref 11.5–14.5)
EST. AVERAGE GLUCOSE BLD GHB EST-MCNC: 123 MG/DL
GLOBULIN SER CALC-MCNC: 3.2 G/DL (ref 2–4)
GLUCOSE SERPL-MCNC: 95 MG/DL (ref 65–100)
GLUCOSE UR STRIP.AUTO-MCNC: NEGATIVE MG/DL
HBA1C MFR BLD: 5.9 % (ref 4–5.6)
HCT VFR BLD AUTO: 37.6 % (ref 35–47)
HGB BLD-MCNC: 11.9 G/DL (ref 11.5–16)
HGB UR QL STRIP: NEGATIVE
HYALINE CASTS URNS QL MICRO: ABNORMAL /LPF (ref 0–2)
IMM GRANULOCYTES # BLD AUTO: 0 K/UL (ref 0–0.04)
IMM GRANULOCYTES NFR BLD AUTO: 0 % (ref 0–0.5)
KETONES UR QL STRIP.AUTO: NEGATIVE MG/DL
LEUKOCYTE ESTERASE UR QL STRIP.AUTO: ABNORMAL
LYMPHOCYTES # BLD: 1.5 K/UL (ref 0.8–3.5)
LYMPHOCYTES NFR BLD: 27 % (ref 12–49)
MCH RBC QN AUTO: 27.3 PG (ref 26–34)
MCHC RBC AUTO-ENTMCNC: 31.6 G/DL (ref 30–36.5)
MCV RBC AUTO: 86.2 FL (ref 80–99)
MONOCYTES # BLD: 0.5 K/UL (ref 0–1)
MONOCYTES NFR BLD: 9 % (ref 5–13)
NEUTS SEG # BLD: 3.3 K/UL (ref 1.8–8)
NEUTS SEG NFR BLD: 60 % (ref 32–75)
NITRITE UR QL STRIP.AUTO: NEGATIVE
NRBC # BLD: 0 K/UL (ref 0–0.01)
NRBC BLD-RTO: 0 PER 100 WBC
PH UR STRIP: 6.5 (ref 5–8)
PLATELET # BLD AUTO: 332 K/UL (ref 150–400)
PMV BLD AUTO: 10.1 FL (ref 8.9–12.9)
POTASSIUM SERPL-SCNC: 4.6 MMOL/L (ref 3.5–5.1)
PROT SERPL-MCNC: 7.6 G/DL (ref 6.4–8.2)
PROT UR STRIP-MCNC: NEGATIVE MG/DL
RBC # BLD AUTO: 4.36 M/UL (ref 3.8–5.2)
RBC #/AREA URNS HPF: ABNORMAL /HPF (ref 0–5)
SODIUM SERPL-SCNC: 138 MMOL/L (ref 136–145)
SP GR UR REFRACTOMETRY: 1.01 (ref 1–1.03)
SPECIMEN EXP DATE BLD: NORMAL
UA: UC IF INDICATED,UAUC: ABNORMAL
UROBILINOGEN UR QL STRIP.AUTO: 0.2 EU/DL (ref 0.2–1)
WBC # BLD AUTO: 5.5 K/UL (ref 3.6–11)
WBC URNS QL MICRO: ABNORMAL /HPF (ref 0–4)

## 2023-01-18 PROCEDURE — 80053 COMPREHEN METABOLIC PANEL: CPT

## 2023-01-18 PROCEDURE — 93005 ELECTROCARDIOGRAM TRACING: CPT

## 2023-01-18 PROCEDURE — 86900 BLOOD TYPING SEROLOGIC ABO: CPT

## 2023-01-18 PROCEDURE — 85025 COMPLETE CBC W/AUTO DIFF WBC: CPT

## 2023-01-18 PROCEDURE — 81001 URINALYSIS AUTO W/SCOPE: CPT

## 2023-01-18 PROCEDURE — 36415 COLL VENOUS BLD VENIPUNCTURE: CPT

## 2023-01-18 PROCEDURE — 83036 HEMOGLOBIN GLYCOSYLATED A1C: CPT

## 2023-01-18 RX ORDER — DEXTROMETHORPHAN HYDROBROMIDE, GUAIFENESIN 5; 100 MG/5ML; MG/5ML
1300 LIQUID ORAL EVERY 8 HOURS
COMMUNITY

## 2023-01-18 RX ORDER — LORATADINE 10 MG/1
10 TABLET ORAL EVERY EVENING
COMMUNITY

## 2023-01-18 RX ORDER — FENOFIBRATE 160 MG/1
160 TABLET ORAL DAILY
COMMUNITY

## 2023-01-18 RX ORDER — IBUPROFEN 200 MG
400 TABLET ORAL
COMMUNITY

## 2023-01-18 RX ORDER — AMLODIPINE BESYLATE 5 MG/1
5 TABLET ORAL DAILY
COMMUNITY

## 2023-01-18 NOTE — PERIOP NOTES
[address] [facility] [address], [address] [facility]   MAIN OR                                  [phone]   MAIN PRE OP                          [phone]                                                                                AMBULATORY PRE OP          [phone]  PRE-ADMISSION TESTING    [phone]   Surgery Date: Wednesday 2/1/23       Is surgery arrival time given by surgeon? NO  If NO, [facility] staff will call you between 3 and 7pm the day before your surgery with your arrival time. Call [phone] after 7pm Monday-Friday if you did not receive this call. INSTRUCTIONS BEFORE YOUR SURGERY   When You  Arrive Arrive at the 2nd [address] [facility] on the day of your surgery  Have your insurance card, photo ID, and any copayment (if needed)   Food   and   Drink NO food or drink after midnight the night before surgery    This means NO water, gum, mints, coffee, juice, etc.  No alcohol (beer, wine, liquor) 24 hours before and after surgery   Medications to   TAKE   Morning of Surgery MEDICATIONS TO TAKE THE MORNING OF SURGERY WITH A SIP OF WATER:   Levothyroxine  Amlodipine  Fenofibrate    DO NOT take Lisinopril/HCTZ the evening before surgery. Medications  To  STOP      7 days before surgery Non-Steroidal anti-inflammatory Drugs (NSAID's): for example, Ibuprofen (Advil, Motrin), Naproxen (Aleve)  Aspirin, if taking for pain   Herbal supplements, vitamins, and fish oil  Other:  (Pain medications not listed above, including Tylenol may be taken)   Blood  Thinners N/A   Bathing Clothing  Jewelry  Valuables     If you shower the morning of surgery, please do not apply anything to your skin (lotions, powders, deodorant, or makeup, especially mascara)  Follow [employer] body wash instructions provided to you during PAT appointment. Begin 3 days prior to surgery.   Do not shave or trim anywhere 24 hours before surgery  Wear your hair loose or down; no pony-tails, buns, or metal hair clips  Wear loose, comfortable, clean clothes  Wear glasses instead of contacts  Leave money, valuables, and jewelry, including body piercings, at home  If you were given an [employer], bring it on day of surgery. Going Home - or Spending the Night   ADMITS: If your doctor is keeping you in the hospital after surgery, leave personal belongings/luggage in your car until you have a hospital room number. Hospital discharge time is 12 noon  Drivers must be here before 12 noon unless you are told differently   Special Instructions Bring completed PTA medication list with you on the day of your surgery. Watch online Joint Replacement class  [employer] parking 7AM-5PM.        Follow all instructions so your surgery wont be cancelled. Please, be on time. If a situation occurs and you are delayed the day of surgery, call [phone]. If your physical condition changes (like a fever, cold, flu, etc.) call your surgeon. Home medication(s) reviewed and verified via  LIST   VERBAL   during PAT appointment. The patient was contacted by    IN-PERSON  The patient verbalizes understanding of all instructions and   DOES   DOES NOT   need reinforcement.

## 2023-01-19 LAB
ATRIAL RATE: 74 BPM
CALCULATED P AXIS, ECG09: -2 DEGREES
CALCULATED R AXIS, ECG10: 1 DEGREES
CALCULATED T AXIS, ECG11: 23 DEGREES
DIAGNOSIS, 93000: NORMAL
P-R INTERVAL, ECG05: 160 MS
Q-T INTERVAL, ECG07: 398 MS
QRS DURATION, ECG06: 82 MS
QTC CALCULATION (BEZET), ECG08: 441 MS
VENTRICULAR RATE, ECG03: 74 BPM

## 2023-01-20 LAB
BACTERIA SPEC CULT: NORMAL
BACTERIA SPEC CULT: NORMAL
SERVICE CMNT-IMP: NORMAL

## 2023-02-01 ENCOUNTER — ANESTHESIA (OUTPATIENT)
Dept: SURGERY | Age: 83
End: 2023-02-01
Payer: MEDICARE

## 2023-02-01 ENCOUNTER — HOSPITAL ENCOUNTER (OUTPATIENT)
Age: 83
Setting detail: OBSERVATION
Discharge: HOME | End: 2023-02-02
Attending: ORTHOPAEDIC SURGERY | Admitting: ORTHOPAEDIC SURGERY
Payer: MEDICARE

## 2023-02-01 ENCOUNTER — APPOINTMENT (OUTPATIENT)
Dept: GENERAL RADIOLOGY | Age: 83
End: 2023-02-01
Attending: ORTHOPAEDIC SURGERY
Payer: MEDICARE

## 2023-02-01 ENCOUNTER — ANESTHESIA EVENT (OUTPATIENT)
Dept: SURGERY | Age: 83
End: 2023-02-01
Payer: MEDICARE

## 2023-02-01 DIAGNOSIS — M16.7 OTHER SECONDARY OSTEOARTHRITIS OF LEFT HIP: Primary | ICD-10-CM

## 2023-02-01 PROCEDURE — 77030020788: Performed by: ORTHOPAEDIC SURGERY

## 2023-02-01 PROCEDURE — G0378 HOSPITAL OBSERVATION PER HR: HCPCS

## 2023-02-01 PROCEDURE — 77030034189: Performed by: ORTHOPAEDIC SURGERY

## 2023-02-01 PROCEDURE — 76030000020 HC AMB SURG 1.5 TO 2 HR INTENSV-TIER 1: Performed by: ORTHOPAEDIC SURGERY

## 2023-02-01 PROCEDURE — 74011000258 HC RX REV CODE- 258: Performed by: NURSE ANESTHETIST, CERTIFIED REGISTERED

## 2023-02-01 PROCEDURE — 77030035236 HC SUT PDS STRATFX BARB J&J -B: Performed by: ORTHOPAEDIC SURGERY

## 2023-02-01 PROCEDURE — 97530 THERAPEUTIC ACTIVITIES: CPT

## 2023-02-01 PROCEDURE — C1776 JOINT DEVICE (IMPLANTABLE): HCPCS | Performed by: ORTHOPAEDIC SURGERY

## 2023-02-01 PROCEDURE — 74011250637 HC RX REV CODE- 250/637: Performed by: ANESTHESIOLOGY

## 2023-02-01 PROCEDURE — 76060000063 HC AMB SURG ANES 1.5 TO 2 HR: Performed by: ORTHOPAEDIC SURGERY

## 2023-02-01 PROCEDURE — 77030040361 HC SLV COMPR DVT MDII -B

## 2023-02-01 PROCEDURE — 74011250636 HC RX REV CODE- 250/636: Performed by: ANESTHESIOLOGY

## 2023-02-01 PROCEDURE — 97161 PT EVAL LOW COMPLEX 20 MIN: CPT

## 2023-02-01 PROCEDURE — 74011250636 HC RX REV CODE- 250/636: Performed by: ORTHOPAEDIC SURGERY

## 2023-02-01 PROCEDURE — 74011250636 HC RX REV CODE- 250/636: Performed by: NURSE ANESTHETIST, CERTIFIED REGISTERED

## 2023-02-01 PROCEDURE — 77030040922 HC BLNKT HYPOTHRM STRY -A

## 2023-02-01 PROCEDURE — 77030031139 HC SUT VCRL2 J&J -A: Performed by: ORTHOPAEDIC SURGERY

## 2023-02-01 PROCEDURE — 76210000037 HC AMBSU PH I REC 2 TO 2.5 HR: Performed by: ORTHOPAEDIC SURGERY

## 2023-02-01 PROCEDURE — 74011000250 HC RX REV CODE- 250: Performed by: NURSE ANESTHETIST, CERTIFIED REGISTERED

## 2023-02-01 PROCEDURE — 77030006835 HC BLD SAW SAG STRY -B: Performed by: ORTHOPAEDIC SURGERY

## 2023-02-01 PROCEDURE — 74011000250 HC RX REV CODE- 250: Performed by: ANESTHESIOLOGY

## 2023-02-01 PROCEDURE — 77030002933 HC SUT MCRYL J&J -A: Performed by: ORTHOPAEDIC SURGERY

## 2023-02-01 PROCEDURE — 77030007866 HC KT SPN ANES BBMI -B

## 2023-02-01 PROCEDURE — 77030041075 HC DRSG AG OPTIFRM MDII -B: Performed by: ORTHOPAEDIC SURGERY

## 2023-02-01 PROCEDURE — 72170 X-RAY EXAM OF PELVIS: CPT

## 2023-02-01 PROCEDURE — 2709999900 HC NON-CHARGEABLE SUPPLY: Performed by: ORTHOPAEDIC SURGERY

## 2023-02-01 PROCEDURE — 77030018547 HC SUT ETHBND1 J&J -B: Performed by: ORTHOPAEDIC SURGERY

## 2023-02-01 PROCEDURE — 74011250637 HC RX REV CODE- 250/637: Performed by: ORTHOPAEDIC SURGERY

## 2023-02-01 PROCEDURE — 77030038692 HC WND DEB SYS IRMX -B: Performed by: ORTHOPAEDIC SURGERY

## 2023-02-01 PROCEDURE — 74011000250 HC RX REV CODE- 250: Performed by: ORTHOPAEDIC SURGERY

## 2023-02-01 PROCEDURE — 77030018723 HC ELCTRD BLD COVD -A: Performed by: ORTHOPAEDIC SURGERY

## 2023-02-01 DEVICE — KNEE K1 TOT HEMI STD CEM IMPL CAPPED SYNTHES: Type: IMPLANTABLE DEVICE | Status: FUNCTIONAL

## 2023-02-01 DEVICE — PINNACLE POROCOAT ACETABULAR SHELL SECTOR II 52MM OD
Type: IMPLANTABLE DEVICE | Site: HIP | Status: FUNCTIONAL
Brand: PINNACLE POROCOAT

## 2023-02-01 DEVICE — PINNACLE HIP SOLUTIONS ALTRX POLYETHYLENE ACETABULAR LINER NEUTRAL 36MM ID 52MM OD
Type: IMPLANTABLE DEVICE | Site: HIP | Status: FUNCTIONAL
Brand: PINNACLE ALTRX

## 2023-02-01 DEVICE — ACTIS DUOFIX HIP PROSTHESIS (FEMORAL STEM 12/14 TAPER CEMENTLESS SIZE 4 HIGH COLLAR)  CE
Type: IMPLANTABLE DEVICE | Site: HIP | Status: FUNCTIONAL
Brand: ACTIS

## 2023-02-01 RX ORDER — HYDROCHLOROTHIAZIDE 25 MG/1
25 TABLET ORAL EVERY EVENING
Status: DISCONTINUED | OUTPATIENT
Start: 2023-02-01 | End: 2023-02-02 | Stop reason: HOSPADM

## 2023-02-01 RX ORDER — FENTANYL CITRATE 50 UG/ML
25 INJECTION, SOLUTION INTRAMUSCULAR; INTRAVENOUS
Status: DISCONTINUED | OUTPATIENT
Start: 2023-02-01 | End: 2023-02-01 | Stop reason: HOSPADM

## 2023-02-01 RX ORDER — AMOXICILLIN 250 MG
1 CAPSULE ORAL 2 TIMES DAILY
Status: DISCONTINUED | OUTPATIENT
Start: 2023-02-01 | End: 2023-02-02 | Stop reason: HOSPADM

## 2023-02-01 RX ORDER — SODIUM CHLORIDE 0.9 % (FLUSH) 0.9 %
5-40 SYRINGE (ML) INJECTION AS NEEDED
Status: DISCONTINUED | OUTPATIENT
Start: 2023-02-01 | End: 2023-02-01 | Stop reason: HOSPADM

## 2023-02-01 RX ORDER — SODIUM CHLORIDE, SODIUM LACTATE, POTASSIUM CHLORIDE, CALCIUM CHLORIDE 600; 310; 30; 20 MG/100ML; MG/100ML; MG/100ML; MG/100ML
75 INJECTION, SOLUTION INTRAVENOUS CONTINUOUS
Status: DISCONTINUED | OUTPATIENT
Start: 2023-02-01 | End: 2023-02-01 | Stop reason: HOSPADM

## 2023-02-01 RX ORDER — PROPOFOL 10 MG/ML
INJECTION, EMULSION INTRAVENOUS
Status: DISCONTINUED | OUTPATIENT
Start: 2023-02-01 | End: 2023-02-01 | Stop reason: HOSPADM

## 2023-02-01 RX ORDER — BUPIVACAINE HYDROCHLORIDE 5 MG/ML
INJECTION, SOLUTION EPIDURAL; INTRACAUDAL AS NEEDED
Status: DISCONTINUED | OUTPATIENT
Start: 2023-02-01 | End: 2023-02-01 | Stop reason: HOSPADM

## 2023-02-01 RX ORDER — SODIUM CHLORIDE 9 MG/ML
125 INJECTION, SOLUTION INTRAVENOUS CONTINUOUS
Status: DISCONTINUED | OUTPATIENT
Start: 2023-02-01 | End: 2023-02-02 | Stop reason: HOSPADM

## 2023-02-01 RX ORDER — OXYCODONE HYDROCHLORIDE 5 MG/1
5 TABLET ORAL
Status: DISCONTINUED | OUTPATIENT
Start: 2023-02-01 | End: 2023-02-02 | Stop reason: HOSPADM

## 2023-02-01 RX ORDER — ONDANSETRON 2 MG/ML
4 INJECTION INTRAMUSCULAR; INTRAVENOUS AS NEEDED
Status: DISCONTINUED | OUTPATIENT
Start: 2023-02-01 | End: 2023-02-01 | Stop reason: HOSPADM

## 2023-02-01 RX ORDER — NALOXONE HYDROCHLORIDE 0.4 MG/ML
0.1 INJECTION, SOLUTION INTRAMUSCULAR; INTRAVENOUS; SUBCUTANEOUS
Status: DISCONTINUED | OUTPATIENT
Start: 2023-02-01 | End: 2023-02-01 | Stop reason: HOSPADM

## 2023-02-01 RX ORDER — LIDOCAINE HYDROCHLORIDE 10 MG/ML
0.1 INJECTION, SOLUTION EPIDURAL; INFILTRATION; INTRACAUDAL; PERINEURAL AS NEEDED
Status: DISCONTINUED | OUTPATIENT
Start: 2023-02-01 | End: 2023-02-01 | Stop reason: HOSPADM

## 2023-02-01 RX ORDER — SODIUM CHLORIDE 0.9 % (FLUSH) 0.9 %
5-40 SYRINGE (ML) INJECTION AS NEEDED
Status: DISCONTINUED | OUTPATIENT
Start: 2023-02-01 | End: 2023-02-02 | Stop reason: HOSPADM

## 2023-02-01 RX ORDER — CELECOXIB 100 MG/1
200 CAPSULE ORAL 2 TIMES DAILY
Status: DISCONTINUED | OUTPATIENT
Start: 2023-02-01 | End: 2023-02-02 | Stop reason: HOSPADM

## 2023-02-01 RX ORDER — LISINOPRIL 20 MG/1
20 TABLET ORAL EVERY EVENING
Status: DISCONTINUED | OUTPATIENT
Start: 2023-02-01 | End: 2023-02-02 | Stop reason: HOSPADM

## 2023-02-01 RX ORDER — CETIRIZINE HYDROCHLORIDE 10 MG/1
10 TABLET ORAL EVERY EVENING
Status: DISCONTINUED | OUTPATIENT
Start: 2023-02-01 | End: 2023-02-02 | Stop reason: HOSPADM

## 2023-02-01 RX ORDER — POLYETHYLENE GLYCOL 3350 17 G/17G
17 POWDER, FOR SOLUTION ORAL DAILY
Status: DISCONTINUED | OUTPATIENT
Start: 2023-02-02 | End: 2023-02-02 | Stop reason: HOSPADM

## 2023-02-01 RX ORDER — SODIUM CHLORIDE 0.9 % (FLUSH) 0.9 %
5-40 SYRINGE (ML) INJECTION EVERY 8 HOURS
Status: DISCONTINUED | OUTPATIENT
Start: 2023-02-01 | End: 2023-02-02 | Stop reason: HOSPADM

## 2023-02-01 RX ORDER — OXYCODONE HYDROCHLORIDE 5 MG/1
10 TABLET ORAL
Status: DISCONTINUED | OUTPATIENT
Start: 2023-02-01 | End: 2023-02-02 | Stop reason: HOSPADM

## 2023-02-01 RX ORDER — HYDROXYZINE HYDROCHLORIDE 10 MG/1
10 TABLET, FILM COATED ORAL
Status: DISCONTINUED | OUTPATIENT
Start: 2023-02-01 | End: 2023-02-02 | Stop reason: HOSPADM

## 2023-02-01 RX ORDER — SODIUM CHLORIDE 0.9 % (FLUSH) 0.9 %
5-40 SYRINGE (ML) INJECTION EVERY 8 HOURS
Status: DISCONTINUED | OUTPATIENT
Start: 2023-02-01 | End: 2023-02-01 | Stop reason: HOSPADM

## 2023-02-01 RX ORDER — HYDROMORPHONE HYDROCHLORIDE 1 MG/ML
.25-1 INJECTION, SOLUTION INTRAMUSCULAR; INTRAVENOUS; SUBCUTANEOUS
Status: DISCONTINUED | OUTPATIENT
Start: 2023-02-01 | End: 2023-02-01 | Stop reason: HOSPADM

## 2023-02-01 RX ORDER — FENTANYL CITRATE 50 UG/ML
INJECTION, SOLUTION INTRAMUSCULAR; INTRAVENOUS AS NEEDED
Status: DISCONTINUED | OUTPATIENT
Start: 2023-02-01 | End: 2023-02-01 | Stop reason: HOSPADM

## 2023-02-01 RX ORDER — FLUMAZENIL 0.1 MG/ML
0.2 INJECTION INTRAVENOUS
Status: DISCONTINUED | OUTPATIENT
Start: 2023-02-01 | End: 2023-02-01 | Stop reason: HOSPADM

## 2023-02-01 RX ORDER — AMLODIPINE BESYLATE 5 MG/1
5 TABLET ORAL DAILY
Status: DISCONTINUED | OUTPATIENT
Start: 2023-02-02 | End: 2023-02-02 | Stop reason: HOSPADM

## 2023-02-01 RX ORDER — ONDANSETRON 2 MG/ML
4 INJECTION INTRAMUSCULAR; INTRAVENOUS
Status: DISCONTINUED | OUTPATIENT
Start: 2023-02-01 | End: 2023-02-02 | Stop reason: HOSPADM

## 2023-02-01 RX ORDER — OXYCODONE HCL 10 MG/1
10 TABLET, FILM COATED, EXTENDED RELEASE ORAL EVERY 12 HOURS
Status: DISCONTINUED | OUTPATIENT
Start: 2023-02-01 | End: 2023-02-01 | Stop reason: HOSPADM

## 2023-02-01 RX ORDER — ALBUTEROL SULFATE 0.83 MG/ML
2.5 SOLUTION RESPIRATORY (INHALATION) AS NEEDED
Status: DISCONTINUED | OUTPATIENT
Start: 2023-02-01 | End: 2023-02-01 | Stop reason: HOSPADM

## 2023-02-01 RX ORDER — CELECOXIB 100 MG/1
100 CAPSULE ORAL 2 TIMES DAILY
Status: DISCONTINUED | OUTPATIENT
Start: 2023-02-01 | End: 2023-02-01 | Stop reason: HOSPADM

## 2023-02-01 RX ORDER — MIDAZOLAM HYDROCHLORIDE 1 MG/ML
INJECTION, SOLUTION INTRAMUSCULAR; INTRAVENOUS AS NEEDED
Status: DISCONTINUED | OUTPATIENT
Start: 2023-02-01 | End: 2023-02-01

## 2023-02-01 RX ORDER — ACETAMINOPHEN 325 MG/1
975 TABLET ORAL ONCE
Status: COMPLETED | OUTPATIENT
Start: 2023-02-01 | End: 2023-02-01

## 2023-02-01 RX ORDER — SERTRALINE HYDROCHLORIDE 50 MG/1
100 TABLET, FILM COATED ORAL EVERY EVENING
Status: DISCONTINUED | OUTPATIENT
Start: 2023-02-01 | End: 2023-02-02 | Stop reason: HOSPADM

## 2023-02-01 RX ORDER — ACETAMINOPHEN 500 MG
1000 TABLET ORAL EVERY 6 HOURS
Status: DISCONTINUED | OUTPATIENT
Start: 2023-02-01 | End: 2023-02-02 | Stop reason: HOSPADM

## 2023-02-01 RX ORDER — FENOFIBRATE 160 MG/1
160 TABLET ORAL DAILY
Status: DISCONTINUED | OUTPATIENT
Start: 2023-02-02 | End: 2023-02-02 | Stop reason: HOSPADM

## 2023-02-01 RX ORDER — HYDROMORPHONE HYDROCHLORIDE 1 MG/ML
0.5 INJECTION, SOLUTION INTRAMUSCULAR; INTRAVENOUS; SUBCUTANEOUS
Status: DISCONTINUED | OUTPATIENT
Start: 2023-02-01 | End: 2023-02-02 | Stop reason: HOSPADM

## 2023-02-01 RX ORDER — NALOXONE HYDROCHLORIDE 0.4 MG/ML
0.4 INJECTION, SOLUTION INTRAMUSCULAR; INTRAVENOUS; SUBCUTANEOUS AS NEEDED
Status: DISCONTINUED | OUTPATIENT
Start: 2023-02-01 | End: 2023-02-02 | Stop reason: HOSPADM

## 2023-02-01 RX ORDER — DIPHENHYDRAMINE HYDROCHLORIDE 50 MG/ML
12.5 INJECTION, SOLUTION INTRAMUSCULAR; INTRAVENOUS AS NEEDED
Status: DISCONTINUED | OUTPATIENT
Start: 2023-02-01 | End: 2023-02-01 | Stop reason: HOSPADM

## 2023-02-01 RX ORDER — ROSUVASTATIN CALCIUM 10 MG/1
20 TABLET, COATED ORAL
Status: DISCONTINUED | OUTPATIENT
Start: 2023-02-01 | End: 2023-02-02 | Stop reason: HOSPADM

## 2023-02-01 RX ORDER — EPHEDRINE SULFATE/0.9% NACL/PF 50 MG/5 ML
SYRINGE (ML) INTRAVENOUS AS NEEDED
Status: DISCONTINUED | OUTPATIENT
Start: 2023-02-01 | End: 2023-02-01 | Stop reason: HOSPADM

## 2023-02-01 RX ORDER — SODIUM CHLORIDE, SODIUM LACTATE, POTASSIUM CHLORIDE, CALCIUM CHLORIDE 600; 310; 30; 20 MG/100ML; MG/100ML; MG/100ML; MG/100ML
125 INJECTION, SOLUTION INTRAVENOUS CONTINUOUS
Status: DISCONTINUED | OUTPATIENT
Start: 2023-02-01 | End: 2023-02-01 | Stop reason: HOSPADM

## 2023-02-01 RX ORDER — FACIAL-BODY WIPES
10 EACH TOPICAL DAILY PRN
Status: DISCONTINUED | OUTPATIENT
Start: 2023-02-03 | End: 2023-02-02 | Stop reason: HOSPADM

## 2023-02-01 RX ORDER — LEVOTHYROXINE SODIUM 112 UG/1
112 TABLET ORAL
Status: DISCONTINUED | OUTPATIENT
Start: 2023-02-02 | End: 2023-02-02 | Stop reason: HOSPADM

## 2023-02-01 RX ORDER — ASPIRIN 81 MG/1
81 TABLET ORAL 2 TIMES DAILY
Status: DISCONTINUED | OUTPATIENT
Start: 2023-02-02 | End: 2023-02-02 | Stop reason: HOSPADM

## 2023-02-01 RX ADMIN — OXYCODONE HYDROCHLORIDE 10 MG: 10 TABLET, FILM COATED, EXTENDED RELEASE ORAL at 08:27

## 2023-02-01 RX ADMIN — ACETAMINOPHEN 975 MG: 325 TABLET ORAL at 08:26

## 2023-02-01 RX ADMIN — SODIUM CHLORIDE, POTASSIUM CHLORIDE, SODIUM LACTATE AND CALCIUM CHLORIDE: 600; 310; 30; 20 INJECTION, SOLUTION INTRAVENOUS at 09:00

## 2023-02-01 RX ADMIN — BUPIVACAINE HYDROCHLORIDE 2.2 ML: 5 INJECTION, SOLUTION EPIDURAL; INTRACAUDAL; PERINEURAL at 09:34

## 2023-02-01 RX ADMIN — SENNOSIDES AND DOCUSATE SODIUM 1 TABLET: 50; 8.6 TABLET ORAL at 17:32

## 2023-02-01 RX ADMIN — LISINOPRIL 20 MG: 20 TABLET ORAL at 17:32

## 2023-02-01 RX ADMIN — SODIUM CHLORIDE, POTASSIUM CHLORIDE, SODIUM LACTATE AND CALCIUM CHLORIDE 1000 ML: 600; 310; 30; 20 INJECTION, SOLUTION INTRAVENOUS at 08:24

## 2023-02-01 RX ADMIN — ACETAMINOPHEN 1000 MG: 500 TABLET ORAL at 14:00

## 2023-02-01 RX ADMIN — OXYCODONE HYDROCHLORIDE 10 MG: 5 TABLET ORAL at 17:36

## 2023-02-01 RX ADMIN — ACETAMINOPHEN 1000 MG: 500 TABLET ORAL at 20:42

## 2023-02-01 RX ADMIN — ONDANSETRON 4 MG: 2 INJECTION INTRAMUSCULAR; INTRAVENOUS at 15:30

## 2023-02-01 RX ADMIN — CEFAZOLIN SODIUM 2 G: 1 POWDER, FOR SOLUTION INTRAMUSCULAR; INTRAVENOUS at 09:59

## 2023-02-01 RX ADMIN — Medication 20 MG: at 11:29

## 2023-02-01 RX ADMIN — SERTRALINE 100 MG: 50 TABLET, FILM COATED ORAL at 17:32

## 2023-02-01 RX ADMIN — OXYCODONE HYDROCHLORIDE 10 MG: 5 TABLET ORAL at 19:42

## 2023-02-01 RX ADMIN — FENTANYL CITRATE 50 MCG: 50 INJECTION, SOLUTION INTRAMUSCULAR; INTRAVENOUS at 09:24

## 2023-02-01 RX ADMIN — CETIRIZINE HYDROCHLORIDE 10 MG: 10 TABLET, FILM COATED ORAL at 17:32

## 2023-02-01 RX ADMIN — CELECOXIB 200 MG: 100 CAPSULE ORAL at 17:32

## 2023-02-01 RX ADMIN — OXYCODONE HYDROCHLORIDE 5 MG: 5 TABLET ORAL at 14:00

## 2023-02-01 RX ADMIN — PHENYLEPHRINE HYDROCHLORIDE 100 MCG: 10 INJECTION INTRAVENOUS at 10:29

## 2023-02-01 RX ADMIN — ROSUVASTATIN CALCIUM 20 MG: 10 TABLET, COATED ORAL at 21:48

## 2023-02-01 RX ADMIN — PHENYLEPHRINE HYDROCHLORIDE 100 MCG: 10 INJECTION INTRAVENOUS at 11:00

## 2023-02-01 RX ADMIN — CEFAZOLIN 2 G: 1 INJECTION, POWDER, FOR SOLUTION INTRAMUSCULAR; INTRAVENOUS at 17:32

## 2023-02-01 RX ADMIN — PROPOFOL 60 MCG/KG/MIN: 10 INJECTION, EMULSION INTRAVENOUS at 10:03

## 2023-02-01 RX ADMIN — SODIUM CHLORIDE 1 G: 9 INJECTION, SOLUTION INTRAVENOUS at 10:01

## 2023-02-01 RX ADMIN — CELECOXIB 100 MG: 100 CAPSULE ORAL at 08:27

## 2023-02-01 RX ADMIN — PHENYLEPHRINE HYDROCHLORIDE 100 MCG: 10 INJECTION INTRAVENOUS at 11:10

## 2023-02-01 RX ADMIN — SODIUM CHLORIDE, PRESERVATIVE FREE 10 ML: 5 INJECTION INTRAVENOUS at 15:34

## 2023-02-01 RX ADMIN — SODIUM CHLORIDE 125 ML/HR: 9 INJECTION, SOLUTION INTRAVENOUS at 13:17

## 2023-02-01 RX ADMIN — FENTANYL CITRATE 50 MCG: 50 INJECTION, SOLUTION INTRAMUSCULAR; INTRAVENOUS at 09:26

## 2023-02-01 RX ADMIN — HYDROCHLOROTHIAZIDE 25 MG: 25 TABLET ORAL at 17:32

## 2023-02-01 RX ADMIN — SODIUM CHLORIDE 1 G: 9 INJECTION, SOLUTION INTRAVENOUS at 11:22

## 2023-02-01 NOTE — OP NOTES
OPERATIVE REPORT    FACILITY: [facility]    PATIENT NAME: Chico Mathis     DATE OF OPERATION: 2/1/2023    PREOPERATIVE DIAGNOSIS: Left hip arthritis    POSTOPERATIVE DIAGNOSIS: same    OPERATIVE PROCEDURE:   1. Left total hip arthroplasty - CPT 80544  2. Fluoroscopy, directed by and interpreted by surgeon - CPT 41950    ATTENDING PHYSICIAN: [employer]. Shana Williamson MD    ASSISTANT:   SA Myles Cardoso [place]    IMPLANTS:    Implant Name Type Inv. Item Serial No.  Lot No. LRB No. Used Action   LINER ACET OD52MM ID36MM HIP ALTRX PINN - SNA  LINER ACET OD52MM ID36MM HIP ALTRX PINN NA [facility] [employer]_ [id number] Left 1 Implanted   SHELL ACET [id number] HIP PORCOAT GISELA DONNA SECT PRESSFIT PINN - SNA  SHELL ACET [id number] HIP PORCOAT GISELA DONNA SECT PRESSFIT PINN NA [facility] [employer]_ [id number] Left 1 Implanted   biolox delta ceramic femoral head +5 36mm reyna 12/14 taper    NA [employer] [id number] Left 1 Implanted   STEM FEM SZ 4 L103MM 12/14 TAPR HI OFFSET HIP DUOFIX CLLRD - SNA  STEM FEM SZ 4 L103MM 12/14 TAPR HI OFFSET HIP DUOFIX CLLRD NA [facility] [employer]_ [id number] Left 1 Implanted       SPECIMENS:  none    OPERATIVE FINDINGS: Advanced arthritis of the operative hip    ANESTHESIA: Spinal + conscious sedation    FLUIDS:  Please see anesthesia record    ESTIMATED BLOOD LOSS: 250cc    INDICATIONS FOR PROCEDURE:   Chico Mathis is a [de-identified] y.o. female who presented to clinic with left hip pain. Radiographs demonstrated advanced arthritic changes of the affected hip. They were counseled on the risks, benefits, and alternatives to surgery. Risks were outlined to include, but were not limited to: bleeding, infection, fracture, damage to blood vessels or nerves, hardware failure, loosening, continued pain, stiffness, leg length inequality, and medical risks including heart attack, stroke, blood clot, and even death.  The patient elected to continue with the operation, and gave informed consent to do so. DETAILED DESCRIPTION OF PROCEDURE:   After anesthesia was induced, the patient was placed on the operative table. The patient's operative lower extremity was prepped and draped in the usual fashion. Appropriate timeouts were performed verifying the correct patient, side, and operation. Preoperative antibiotics were administered. An incision was made over the affected hip two fingerbreadths distal and lateral to the ASIS extending in line towards the ipsilateral fibular head. Dissection was taken through skin and subcutaneous tissue. Hemostasis was obtained. The fascia overlying the tensor fascia cierra was incised and developed medially over to the medial border of the TFL muscle belly by blunt dissection. The superior femoral neck was palpated and two cobra retractors were placed superior and inferior to the femoral neck. The ascending branches of the lateral femoral circumflex arteries were identified and coagulated. The capsule was identified and cleared of pericapsular fat. The capsulotomy was made and tagged with sutures. The femoral neck cut was made in keeping with the preoperative planned level and angle. The femoral head was removed with a corkscrew. Attention was turned to the acetabulum. Retractors were placed around it for exposure. The pulvinar and labrum was removed. The acetabulum was sequentially reamed up to size 51mm. A trial cup was placed, and found to fit well. The final acetabular component was placed under fluoroscopic guidance to the desired depth, inclination, and anteversion. A trial liner was placed. Attention was then turned to the femur. The superior capsular flap was used to peel the superior capsule from the femoral neck and sequential releases were completed posteriorly and medially along the inside of the greater trochanter with care taken to avoid release of the abductors and obturator externus.  After releases allowed for adequate exposure of the proximal femur, retractors were placed on the medial and superolateral portions of the proximal femur. The canal was opened with a box osteotome and it was then sounded with a canal finder. Sequential broaching was completed up to size 4 stem. The calcar reamer was used to smooth rough bone edges down to the level of the broach. A trial neck and ball were placed and the hip was reduced. I felt that the combination of a high offset neck and +5 ball gave the best combination of offset, stability, leg lengths and restoration of hip mechanics. The trial components were removed and the real acetabular liner was placed. The femoral canal was washed and cleaned and the final stem was placed. The trial ball was put on the stem and a size 36+5mm head was selected. The final head was impacted in line with the neck after cleaning and drying the trunion. The components were reduced and final fluoroscopy was completed to confirm appropriate component position without apparent complication. The wound was then irrigated and closed in layers. A #1 vicryl stitch was used to reapproximate the capsule, followed by a #2 Quill stitch for the fascia overlying the TFL. Subcutaneous closure was done with 2-0 vicryl stitch and 3-0 monocryl for the final skin layer. A sterile dressing was applied. All sponge and needle counts were correct at the end of the case. The patient was awoken from the operation without complication and was transported to PACU in stable condition. All counts were correct at the conclusion of the case. DISPOSITION: Stable to PACU, X-rays to be completed in PACU    POSTOPERATIVE PLAN: The patient will begin same day postoperative physical therapy with mobilization. Postoperative pain control will be with PO and IV medications. DVT prophylaxis will be BID aspirin. After the patient has mobilized appropriately and is deemed appropriate for discharge from PT, they will be discharged home.     FOLLOW UP: We will plan to see the patient back in clinic approximately 2 weeks after surgery for a wound check and follow up on clinical progress.

## 2023-02-01 NOTE — BRIEF OP NOTE
Brief Postoperative Note    Patient: Raisa Castorena  YOB: 1940  MRN: [id number]    Date of Procedure: 2/1/2023     Pre-Op Diagnosis: OTHER SECONDARY OA OF LEFT HIP    Post-Op Diagnosis: same      Procedure(s):  LEFT ANTERIOR TOTAL HIP REPLACEMENT    Surgeon(s):  Nina Bill MD    Surgical Assistant: Surg Asst-1: (Unknown)    Anesthesia: Spinal     Estimated Blood Loss (mL): 976TR    Complications: None    Specimens: * No specimens in log *     Implants: * No surgical log found *    Drains: * No LDAs found *    Findings: advanced arthritis of the left hip    Electronically Signed by Cyn Soto MD on 2/1/2023 at 6:27 AM

## 2023-02-01 NOTE — ANESTHESIA PROCEDURE NOTES
Spinal Block    Start time: 2/1/2023 9:24 AM  End time: 2/1/2023 9:34 AM  Performed by: Mayra Nava MD  Authorized by: Mayra Nava MD     Pre-procedure:   Indications: at surgeon's request and primary anesthetic  Preanesthetic Checklist: patient identified, risks and benefits discussed, anesthesia consent, site marked, patient being monitored, timeout performed and fire risk safety assessment completed and verbalized    Timeout Time: 09:24 EST      Spinal Block:   Patient Position:  Seated  Prep Region:  Lumbar  Prep: chlorhexidine      Location:  L3-4  Technique:  Single shot      Med Admin Time: 2/1/2023 9:34 AM    Needle:   Needle Type:  Pencan  Needle Gauge:  25 G  Attempts:  1      Events: CSF confirmed, no blood with aspiration and no paresthesia        Assessment:  Insertion:  Uncomplicated  Patient tolerance:  Patient tolerated the procedure well with no immediate complications

## 2023-02-01 NOTE — PROGRESS NOTES
Problem: Falls - Risk of  Goal: *Absence of Falls  Description: Document [place] Fail Fall Risk and appropriate interventions in the flowsheet.   Outcome: Progressing Towards Goal  Note: Fall Risk Interventions:                                Problem: Patient Education: Go to Patient Education Activity  Goal: Patient/Family Education  Outcome: Progressing Towards Goal     Problem: Patient Education: Go to Patient Education Activity  Goal: Patient/Family Education  Outcome: Progressing Towards Goal     Problem: Hip Replacement: Day of Surgery/Unit  Goal: Off Pathway (Use only if patient is Off Pathway)  Outcome: Progressing Towards Goal  Goal: Activity/Safety  Outcome: Progressing Towards Goal  Goal: Consults, if ordered  Outcome: Progressing Towards Goal  Goal: Diagnostic Test/Procedures  Outcome: Progressing Towards Goal  Goal: Nutrition/Diet  Outcome: Progressing Towards Goal  Goal: Medications  Outcome: Progressing Towards Goal  Goal: Respiratory  Outcome: Progressing Towards Goal  Goal: Treatments/Interventions/Procedures  Outcome: Progressing Towards Goal  Goal: Psychosocial  Outcome: Progressing Towards Goal  Goal: *Initiate mobility  Outcome: Progressing Towards Goal  Goal: *Hemodynamically stable  Outcome: Progressing Towards Goal     Problem: Pain  Goal: *Control of Pain  Outcome: Progressing Towards Goal  Goal: *PALLIATIVE CARE:  Alleviation of Pain  Outcome: Progressing Towards Goal     Problem: Patient Education: Go to Patient Education Activity  Goal: Patient/Family Education  Outcome: Progressing Towards Goal

## 2023-02-01 NOTE — ANESTHESIA POSTPROCEDURE EVALUATION
Procedure(s):  LEFT ANTERIOR TOTAL HIP REPLACEMENT.    spinal    Anesthesia Post Evaluation      Multimodal analgesia: multimodal analgesia used between 6 hours prior to anesthesia start to PACU discharge  Patient location during evaluation: bedside  Patient participation: complete - patient participated  Level of consciousness: awake and sleepy but conscious  Pain score: 0  Pain management: adequate  Airway patency: patent  Anesthetic complications: no  Cardiovascular status: acceptable  Respiratory status: acceptable  Hydration status: acceptable  Comments: Immediate cv/pulm status within acceptable preop limits. Post anesthesia nausea and vomiting:  controlled  Final Post Anesthesia Temperature Assessment:  Normothermia (36.0-37.5 degrees C)      INITIAL Post-op Vital signs:   Vitals Value Taken Time   /59 02/01/23 1151   Temp     Pulse 70 02/01/23 1154   Resp 17 02/01/23 1154   SpO2 100 % 02/01/23 1154   Vitals shown include unvalidated device data.

## 2023-02-01 NOTE — ADDENDUM NOTE
Addendum  created 02/01/23 1200 by Vianney Topete, CRNA    Flowsheet accepted, Intraprocedure Event deleted, Intraprocedure Event edited, Intraprocedure Meds edited

## 2023-02-01 NOTE — PROGRESS NOTES
Problem: Mobility Impaired (Adult and Pediatric)  Goal: *Acute Goals and Plan of Care (Insert Text)  2/1/2023 1714 by LISA Burden  Outcome: Progressing Towards Goal  2/1/2023 1713 by LISA Burden  Note: FUNCTIONAL STATUS PRIOR TO ADMISSION: Patient was independent and active without use of DME.    HOME SUPPORT PRIOR TO ADMISSION: The patient lived alone with children and friends to provide assistance. Physical Therapy Goals  Initiated 2/1/2023    1. Patient will move from supine to sit and sit to supine , scoot up and down, and roll side to side in bed with independence within 4 days. 2. Patient will perform sit to stand with supervision/set-up within 4 days. 3. Patient will ambulate with supervision/set-up for 75 feet with the least restrictive device within 4 days. 4. Patient will ascend/descend 3 stairs with 1 handrail(s) with supervision/set-up within 4 days. 5. Patient will verbalize and demonstrate understanding of anterior hip precautions per protocol within 4 days. 6. Patient will perform KARLOS home exercise program per protocol with independence within 4 days. PHYSICAL THERAPY EVALUATION  Patient: Sarai Lozada (43 y.o. 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 The patient was received in supine with ice on L hip. The patient displayed good motor control and sensation was intact. After reviewing anterior hip precautions and HEP and performing bed level exercises, the patient was able to get to EOB with min A, where she initially denied dizziness or lightheadedness, but soon mentioned feeling nauseous.  PT assessed vitals which revealed systolic BP less <315 mmHg. PT instructed patient to perform ankle pumps and LAQ before reassessing vitals to reveal further drop as seen below. Patient was assisted back into supine and offered emesis bag for increased nausea. Vitals were monitored until normalized. Due to signs of post surgical orthostatic hypotension, ambulation was not able to be assessed. Patient will be seen by PT tomorrow for second attempt at transfers and gait training in order to meet stated goals. Patient is expected to have improved tolerance of activity and positional change tomorrow and is expected to progress well. 02/01/23 1520 02/01/23 1521 02/01/23 1523   Vital Signs   BP (!) 94/55 (!) 73/55 (!) 89/52   MAP (Calculated) 68 (!) 61 (!) 64   BP 1 Location Left upper arm Left upper arm Left upper arm   BP 1 Method Automatic Automatic Automatic   BP Patient Position Sitting Sitting Supine      02/01/23 1527   Vital Signs   /68   MAP (Calculated) 86   BP 1 Location Left upper arm   BP 1 Method Automatic   BP Patient Position Supine     Current Level of Function Impacting Discharge (mobility/balance): orthostatic with positional change. Requires assistance for supine<>sit at this time. Post surgical weakness, ROM limitation, and has not yet demonstrated successful transfers/ambulation/stairs. Functional Outcome Measure: The patient scored 0/28 on the Tinetti outcome measure which is indicative of severe fall risk. Other factors to consider for discharge: high functioning prior to admission, has necessary DME, family and friend staying with her while recovering from surgery. Patient will benefit from skilled therapy intervention to address the above noted impairments.        PLAN :  Recommendations and Planned Interventions: bed mobility training, transfer training, gait training, therapeutic exercises, neuromuscular re-education, modalities, edema management/control, patient and family training/education, and therapeutic activities      Frequency/Duration: Patient will be followed by physical therapy:  twice daily to address goals. Recommendation for discharge: (in order for the patient to meet his/her long term goals)  Outpatient physical therapy follow up recommended for improved strength & ROM, optimization of gait, balance, and stairs    This discharge recommendation:  Has not yet been discussed the attending provider and/or case management    IF patient discharges home will need the following DME: patient owns DME required for discharge         SUBJECTIVE:   Patient stated I feel kind of like I am going to throw up.     OBJECTIVE DATA SUMMARY:   HISTORY:    Past Medical History:   Diagnosis Date    Anxiety     Bleeds easily ([address]     During Cataract sx had to have Vitamin K- never tested for genetic clotting disorder    High cholesterol     Hypertension     Hypothyroid     White coat syndrome with hypertension      Past Surgical History:   Procedure Laterality Date    HX ANKLE FRACTURE TX Right     HX CATARACT REMOVAL Bilateral     PT DENIES    HX HYSTERECTOMY      HX LUMBAR LAMINECTOMY  07/2022       Personal factors and/or comorbidities impacting plan of care: risk of bleeds, anxiety, HTN, Hx of Anxiety    Home Situation  Home Environment: Private residence  # Steps to Enter: 3  Rails to Enter: Yes  Hand Rails : Bilateral  One/Two Story Residence: One story  Living Alone: Yes  Support Systems: Child(emery), Friend/Neighbor  Patient Expects to be Discharged to[de-identified] Home with family assistance  Current DME Used/Available at Home: Ruslan Rhein, rolling, Shower chair, Grab bars, Cane, straight, Raised toilet seat  Tub or Shower Type: Shower    EXAMINATION/PRESENTATION/DECISION MAKING:   Critical Behavior:  Neurologic State: Alert  Orientation Level: Oriented X4  Cognition: Follows commands       Range Of Motion:  AROM: Generally decreased, functional           PROM: Generally decreased, functional           Strength: Strength: Generally decreased, functional                    Tone & Sensation:                  Sensation: Intact          Functional Mobility:  Bed Mobility:     Supine to Sit: Minimum assistance  Sit to Supine: Moderate assistance       Balance:   Sitting: High guard; Impaired       Right Side Weight Bearing: Full  Left Side Weight Bearing: As tolerated       Therapeutic Exercises:   Quad sets, heel sets, heel slides, glute sets, ankle pumps, LAQ    Functional Measure:  Tinetti test:    Sitting Balance: 0  Arises: 0  Attempts to Rise: 0  Immediate Standing Balance: 0  Standing Balance: 0  Nudged: 0  Eyes Closed: 0  Turn 360 Degrees - Continuous/Discontinuous: 0  Turn 360 Degrees - Steady/Unsteady: 0  Sitting Down: 0  Balance Score: 0 Balance total score  Indication of Gait: 0  R Step Length/Height: 0  L Step Length/Height: 0  R Foot Clearance: 0  L Foot Clearance: 0  Step Symmetry: 0  Step Continuity: 0  Path: 0  Trunk: 0  Walking Time: 0  Gait Score: 0 Gait total score  Total Score: 0/28 Overall total score         Tinetti Tool Score Risk of Falls  <19 = High Fall Risk  19-24 = Moderate Fall Risk  25-28 = Low Fall Risk  Tinetti ME. Performance-Oriented Assessment of Mobility Problems in Elderly Patients. García 66; [id number].  (Scoring Description: PT Bulletin Feb. 10, 1993)    Older adults: Roseline Lopez et al, 2009; n = 1000 [facility] elderly evaluated with ABC, SUSY, ADL, and IADL)  · Mean SUSY score for males aged 69-68 years = 26.21(3.40)  · Mean SUSY score for females age 69-68 years = 25.16(4.30)  · Mean SUSY score for males over 80 years = 23.29(6.02)  · Mean SUSY score for females over 80 years = 17.20(8.32)           Physical Therapy Evaluation Charge Determination   History Examination Presentation Decision-Making   HIGH Complexity :3+ comorbidities / personal factors will impact the outcome/ POC  LOW Complexity : 1-2 Standardized tests and measures addressing body structure, function, activity limitation and / or participation in recreation  MEDIUM Complexity : Evolving with changing characteristics  Other outcome measures Tinetti  HIGH       Based on the above components, the patient evaluation is determined to be of the following complexity level: MEDIUM    Pain Rating:  none    Activity Tolerance:   Poor and signs and symptoms of orthostatic hypotension    After treatment patient left in no apparent distress:   Call bell within reach, Caregiver / family present, and Side rails x 3    COMMUNICATION/EDUCATION:   The patients plan of care was discussed with: Registered nurse. Fall prevention education was provided and the patient/caregiver indicated understanding., Patient/family have participated as able in goal setting and plan of care. , and Patient/family agree to work toward stated goals and plan of care.     Thank you for this referral.  Phani Cast, SPT   Time Calculation: 18 mins

## 2023-02-01 NOTE — PERIOP NOTES
TRANSFER - OUT REPORT:    Verbal report given to Pavonrosales Miranda on Gaurav Dillon  being transferred to 4th floor(unit) for routine post - op       Report consisted of patients Situation, Background, Assessment and   Recommendations(SBAR). Information from the following report(s) SBAR was reviewed with the receiving nurse. Lines:   Peripheral IV 02/01/23 Posterior;Right Hand (Active)   Site Assessment Clean, dry, & intact 02/01/23 1300   Phlebitis Assessment 0 02/01/23 1300   Infiltration Assessment 0 02/01/23 1300   Dressing Status Clean, dry, & intact 02/01/23 1300   Dressing Type Transparent 02/01/23 1300   Hub Color/Line Status Pink; Infusing 02/01/23 1300   Alcohol Cap Used Yes 02/01/23 4890        Opportunity for questions and clarification was provided.       Patient transported with:   Registered Nurse

## 2023-02-01 NOTE — ANESTHESIA PREPROCEDURE EVALUATION
Relevant Problems   No relevant active problems       Anesthetic History               Review of Systems / Medical History  Patient summary reviewed, nursing notes reviewed and pertinent labs reviewed    Pulmonary                   Neuro/Psych              Cardiovascular    Hypertension              Exercise tolerance: <4 METS  Comments: Denied recent cv/pulm complaints or changes. GI/Hepatic/Renal               Comments: Denied active reflux symptoms Endo/Other      Hypothyroidism       Other Findings              Physical Exam    Airway  Mallampati: II  TM Distance: > 6 cm  Neck ROM: normal range of motion   Mouth opening: Normal     Cardiovascular  Regular rate and rhythm,  S1 and S2 normal,  no murmur, click, rub, or gallop  Rhythm: regular  Rate: normal         Dental      Comments: No loose teeth. Pulmonary  Breath sounds clear to auscultation               Abdominal  Abdominal exam normal       Other Findings            Anesthetic Plan    ASA: 2  Anesthesia type: spinal          Induction: Intravenous  Anesthetic plan and risks discussed with: Patient and Family      Denied bleeding dyscrasia.

## 2023-02-01 NOTE — H&P
Orthopaedic PRE-OP Admission History and Physical        Subjective:   Patient is a [de-identified] y.o.  female who presented for  left hip pain. The patient was evaluated and determined the most appropriate plan of care is to proceed with surgical intervention. Conservative measures were not indicated or successful. Patient Active Problem List    Diagnosis Date Noted    Lumbar stenosis 07/25/2022     Past Medical History:   Diagnosis Date    Anxiety     Bleeds easily ([address]     During Cataract sx had to have Vitamin K- never tested for genetic clotting disorder    High cholesterol     Hypertension     Hypothyroid     White coat syndrome with hypertension       Past Surgical History:   Procedure Laterality Date    HX ANKLE FRACTURE TX Right     HX CATARACT REMOVAL Bilateral     PT DENIES    HX HYSTERECTOMY      HX LUMBAR LAMINECTOMY  07/2022      Prior to Admission medications    Medication Sig Start Date End Date Taking? Authorizing Provider   ibuprofen (MOTRIN) 200 mg tablet Take 400 mg by mouth every eight (8) hours as needed for Pain. Provider, Historical   acetaminophen (TYLENOL) 650 mg TbER Take 1,300 mg by mouth every eight (8) hours. Provider, Historical   cyanocobalamin, vitamin B-12, (VITAMIN B-12 PO) Take 1 Tablet by mouth daily. Provider, Historical   loratadine (Allerclear) 10 mg tablet Take 10 mg by mouth every evening. Provider, Historical   amLODIPine (NORVASC) 5 mg tablet Take 5 mg by mouth daily. Provider, Historical   fenofibrate (LOFIBRA) 160 mg tablet Take 160 mg by mouth daily. Provider, Historical   multivitamin with minerals (HAIR,SKIN AND NAILS PO) Take 12,000 mg by mouth daily. Provider, Historical   lisinopril-hydroCHLOROthiazide (PRINZIDE, ZESTORETIC) 20-25 mg per tablet Take 1 Tablet by mouth every evening. Provider, Historical   rosuvastatin (CRESTOR) 20 mg tablet Take 20 mg by mouth nightly.     Provider, Historical   multivitamin, tx-iron-ca-min (THERA-M w/ IRON) 9 mg iron-400 mcg tab tablet Take 1 Tablet by mouth every evening. Provider, Joshua   sertraline (ZOLOFT) 100 mg tablet Take 100 mg by mouth every evening. Other, MD Sharmila   levothyroxine (SYNTHROID) 112 mcg tablet Take 112 mcg by mouth Daily (before breakfast). OtherSharmila MD     No current facility-administered medications for this encounter. Current Outpatient Medications   Medication Sig    ibuprofen (MOTRIN) 200 mg tablet Take 400 mg by mouth every eight (8) hours as needed for Pain. acetaminophen (TYLENOL) 650 mg TbER Take 1,300 mg by mouth every eight (8) hours. cyanocobalamin, vitamin B-12, (VITAMIN B-12 PO) Take 1 Tablet by mouth daily. loratadine (Allerclear) 10 mg tablet Take 10 mg by mouth every evening. amLODIPine (NORVASC) 5 mg tablet Take 5 mg by mouth daily. fenofibrate (LOFIBRA) 160 mg tablet Take 160 mg by mouth daily. multivitamin with minerals (HAIR,SKIN AND NAILS PO) Take 12,000 mg by mouth daily. lisinopril-hydroCHLOROthiazide (PRINZIDE, ZESTORETIC) 20-25 mg per tablet Take 1 Tablet by mouth every evening. rosuvastatin (CRESTOR) 20 mg tablet Take 20 mg by mouth nightly. multivitamin, tx-iron-ca-min (THERA-M w/ IRON) 9 mg iron-400 mcg tab tablet Take 1 Tablet by mouth every evening. sertraline (ZOLOFT) 100 mg tablet Take 100 mg by mouth every evening. levothyroxine (SYNTHROID) 112 mcg tablet Take 112 mcg by mouth Daily (before breakfast). Allergies   Allergen Reactions    Latex Hives    Iodinated Contrast Media Anaphylaxis and Hives      Social History     Tobacco Use    Smoking status: Never    Smokeless tobacco: Never   Substance Use Topics    Alcohol use: Yes     Comment: socially      No family history on file. Review of Systems  A comprehensive review of systems was negative except for that written in the HPI. Objective:   No data found. No data recorded.       Gen: Well-developed,  in no acute distress   HEENT: Pink conjunctivae  Neck: Supple, without obvious masses  Resp: No accessory muscle use, no acute respiratory distress   Card: RRR  Abd: Supple  Lymph: No obvious lymphadenopathy of the affectedv extremity  Musculo-skeletal :   Left hip:  No skin lesions  No apparent  NV deficit  Skin: No skin breakdown noted. Neuro: Cranial nerves are grossly intact,follows commands appropriately   Psych: Alert and oriented       LABS :  No results for input(s): HGB, HCT, INR, NA, K, CL, CO2, BUN, CREA, GLU, HGBEXT, HCTEXT, INREXT in the last 72 hours. X-RAYS : Left hip arthritis  Assessment:   Left hip arthritis  Plan:   Plan for left total hip replacement. I discussed surgical indications and alternatives with the patient. Risks including infection, bleeding, damage to other structure, need for further surgery and the risks of anesthesia have been discussed with the patient. Verbal and written consent were obtained.

## 2023-02-02 VITALS
RESPIRATION RATE: 14 BRPM | WEIGHT: 150.57 LBS | SYSTOLIC BLOOD PRESSURE: 100 MMHG | TEMPERATURE: 98.6 F | HEIGHT: 62 IN | BODY MASS INDEX: 27.71 KG/M2 | HEART RATE: 77 BPM | DIASTOLIC BLOOD PRESSURE: 55 MMHG | OXYGEN SATURATION: 97 %

## 2023-02-02 PROCEDURE — 97165 OT EVAL LOW COMPLEX 30 MIN: CPT

## 2023-02-02 PROCEDURE — 97530 THERAPEUTIC ACTIVITIES: CPT

## 2023-02-02 PROCEDURE — 74011250636 HC RX REV CODE- 250/636: Performed by: ORTHOPAEDIC SURGERY

## 2023-02-02 PROCEDURE — 74011000250 HC RX REV CODE- 250: Performed by: ORTHOPAEDIC SURGERY

## 2023-02-02 PROCEDURE — 74011250637 HC RX REV CODE- 250/637: Performed by: ORTHOPAEDIC SURGERY

## 2023-02-02 PROCEDURE — 97535 SELF CARE MNGMENT TRAINING: CPT

## 2023-02-02 PROCEDURE — 97116 GAIT TRAINING THERAPY: CPT

## 2023-02-02 PROCEDURE — G0378 HOSPITAL OBSERVATION PER HR: HCPCS

## 2023-02-02 RX ORDER — ASPIRIN 81 MG/1
81 TABLET ORAL 2 TIMES DAILY
Qty: 60 TABLET | Refills: 0 | Status: SHIPPED | OUTPATIENT
Start: 2023-02-02

## 2023-02-02 RX ORDER — OXYCODONE HYDROCHLORIDE 5 MG/1
5 TABLET ORAL
Qty: 30 TABLET | Refills: 0 | Status: SHIPPED | OUTPATIENT
Start: 2023-02-02 | End: 2023-02-09

## 2023-02-02 RX ORDER — DEXTROMETHORPHAN HYDROBROMIDE, GUAIFENESIN 5; 100 MG/5ML; MG/5ML
650 LIQUID ORAL EVERY 8 HOURS
Qty: 60 TABLET | Refills: 1 | Status: SHIPPED | OUTPATIENT
Start: 2023-02-02

## 2023-02-02 RX ORDER — POLYETHYLENE GLYCOL 3350 17 G/17G
17 POWDER, FOR SOLUTION ORAL DAILY
Qty: 7 PACKET | Refills: 0 | Status: SHIPPED | OUTPATIENT
Start: 2023-02-02 | End: 2023-02-09

## 2023-02-02 RX ORDER — MELOXICAM 7.5 MG/1
7.5 TABLET ORAL DAILY
Qty: 30 TABLET | Refills: 0 | Status: SHIPPED | OUTPATIENT
Start: 2023-02-02 | End: 2023-03-04

## 2023-02-02 RX ADMIN — OXYCODONE HYDROCHLORIDE 10 MG: 5 TABLET ORAL at 00:50

## 2023-02-02 RX ADMIN — AMLODIPINE BESYLATE 5 MG: 5 TABLET ORAL at 08:28

## 2023-02-02 RX ADMIN — CELECOXIB 200 MG: 100 CAPSULE ORAL at 08:29

## 2023-02-02 RX ADMIN — ACETAMINOPHEN 1000 MG: 500 TABLET ORAL at 02:15

## 2023-02-02 RX ADMIN — ACETAMINOPHEN 1000 MG: 500 TABLET ORAL at 08:29

## 2023-02-02 RX ADMIN — ASPIRIN 81 MG: 81 TABLET, COATED ORAL at 08:29

## 2023-02-02 RX ADMIN — OXYCODONE HYDROCHLORIDE 10 MG: 5 TABLET ORAL at 06:05

## 2023-02-02 RX ADMIN — SENNOSIDES AND DOCUSATE SODIUM 1 TABLET: 50; 8.6 TABLET ORAL at 08:28

## 2023-02-02 RX ADMIN — POLYETHYLENE GLYCOL 3350 17 G: 17 POWDER, FOR SOLUTION ORAL at 08:29

## 2023-02-02 RX ADMIN — OXYCODONE HYDROCHLORIDE 10 MG: 5 TABLET ORAL at 08:33

## 2023-02-02 RX ADMIN — SODIUM CHLORIDE, PRESERVATIVE FREE 10 ML: 5 INJECTION INTRAVENOUS at 05:49

## 2023-02-02 RX ADMIN — FENOFIBRATE 160 MG: 160 TABLET ORAL at 08:28

## 2023-02-02 RX ADMIN — CEFAZOLIN 2 G: 1 INJECTION, POWDER, FOR SOLUTION INTRAMUSCULAR; INTRAVENOUS at 02:16

## 2023-02-02 RX ADMIN — LEVOTHYROXINE SODIUM 112 MCG: 0.11 TABLET ORAL at 08:29

## 2023-02-02 NOTE — PROGRESS NOTES
Medicare Outpatient Observation Notice (MOON)/ [place] Outpatient Observation Notice ([facility]) provided to patient/representative with verbal explanation of the notice. Time allotted for questions regarding the notice. Patient /representative provided a completed copy of the MOON/VOON notice. Copy placed on bedside chart.   Eladia Acosta CMS

## 2023-02-02 NOTE — PROGRESS NOTES
Orthopedic Rounding Note    Subjective:  Patient reports doing ok this morning. Pain is moderate. Objective:  Visit Vitals  /69 (BP 1 Location: Left upper arm, BP Patient Position: At rest;Lying)   Pulse 70   Temp 97.8 °F (36.6 °C)   Resp 16   Ht 5' 2\" (1.575 m)   Wt 68.3 kg (150 lb 9.2 oz)   SpO2 97%   BMI 27.54 kg/m²       No results found for this or any previous visit (from the past 24 hour(s)).       Exam:    NAD   Breathing well on room air  Adequate perfusion in distal extremities      LLE:  Bandage C/D/I  Swelling within expected limits  No evidence of DVT  Palpable pedal pulse distally  Sensation intact to light touch in S/S/DP/SP/T nerve distributions  Motor intact to EHL/FHL/TA/GSC nerve distributions        Assessment:  S/p L KARLOS    Plan:  Aspirin, SCDs DVT prophylaxis  WBAT  PTOT  Continue in hospital care for now  Pain control  Anticipate discharge today    Ashley Estrada MD

## 2023-02-02 NOTE — DISCHARGE INSTRUCTIONS
Discharge Instructions after Total Hip Replacement  Ella Li MD  Office phone number: [phone]; extension to leave voicemail [phone] or [phone]  After hours (weekdays after 5PM or on weekends): Please call [phone], and follow the prompts to reach the on call provider      Activity:  You may put full weight on your operated leg unless otherwise instructed  Walk with a walker or two crutches for 2 weeks after surgery, then plan to go to a single point cane or single crutch for 2 weeks after that. The reason to walk with an assistive device is to avoid losing your balance and falling  Elevate your operated extremity (\"toes above nose\") throughout the day to decrease swelling and pain  Ice your operated hip multiple (3-4) times a day to decrease swelling and pain. Use a bag of ice or frozen vegetables inside a towel, placed onto the skin. This should be done for about 20-30 minutes at a time, with at least 20-30 minutes before the next icing session  Unless otherwise indicated, we will plan on starting physical therapy at your 2 week postoperative visit. You may have been given a prescription for PT before the surgery or at the time of discharge from the hospital  Remember your hip precautions reviewed with you by your physical therapist while in the hospital. In general, avoid extremes of range of motion for the first couple of months after srugery. Be sure to avoid those activities that are recommended against to avoid hip dislocation. Physical Therapy: We may have you undergo physical therapy after your operation. If so, you should have been provided a prescription for PT that details the goals that I have for the physical therapist after surgery. If you do PT, you should be attending PT 2x per week for 4 weeks after surgery, starting a couple of weeks after the date of surgery. Be sure to be performing home exercises to consolidate the gains you are making in PT.  The best exercise after total hip replacement is walking. The PT office should be faxing weekly reports so that I remain informed of your progress, in case we need to intervene sooner than planned. Medicines: You have been prescribed multiple medications after your surgery. They typically will include pain medication, a blood thinner (to prevent blood clots), an antiinflammatory, and sometimes may include a stool softener, an antibiotic, and accessory pain control medications. Take the antiinflammatory as prescribed with food to avoid stomach upset. Take the blood thinning medication just as prescribed so as to prevent a blood clot. This is the most critical medication to be sure to take. Take the pain medication as needed for pain to improve pain control. The most helpful times to take the pain medication are before physical therapy, and shortly before bed. Wean this medication as able. Also be mindful that there are restrictions on how much pain medication we are able to give at the time of discharge. If you run out, it may take a few days to be able to refill, so be sure to allow time for refills to process. In particular, on nights and over the weekend, refills may be difficult to obtain. Dressing:  Typically, you have a silver-impregnated dressing placed over the wound which should remain for the first 7-10 days. There may be slight drainage on the dressing in the first week or so, but should not be large volumes, and the drainage should resolve after the first several days or so after surgery. After the removal of the initial dressing, you will place a large clean dry gauze pad (ABD pad, which can be bought from a drug store if you were not supplied with any), secured with paper tape (also available from a drug store) over the incision. This should be replaced daily or every other day, depending on how frequently you shower. Your wound is usually closed with absorbable stitches and glue with tape on the skin.  Leave the glue and the tape on the skin until you follow up at 2 weeks after surgery. At that point, it will likely be removed. Call our office with questions about the dressing or if there are any concerns    Home Care: There are no restrictions as to which position you are allowed to sleep in, but you may find sleeping on your side as being somewhat painful at first if your incision is on the side of the hip. If you choose to sleep on your side, be sure to put a pillow in between your legs to help prevent inappropriate motion of the hip leading to a dislocation  You may shower after your surgery, but do not submerge the wound in water (pool, hot tub, bath, etc.). After a shower, pat the incision dry with a towel, but do not scrape or rub dry  Be sure to ice and elevate the leg frequently  Try to be as active as is feasible (up and walking around). This not only speeds your recovery, but also prevents blood clots    Follow Up:  Plan to be seen in the office at the 2 week agustin, then at the 6 week agustin. You may see my physician assistant at the 2 week visit. Your follow up may already be set up by the time you have the surgery, or may be set up shortly after you leave the hospital.     When to Contact Our Office :  When possible, if you have questions or concerns after your surgery, it is preferable to deal with these through our office (instead of an urgent care or emergency department), as we are more familiar with your care and treatment plan. Call our office if:      You have questions about your medications  You have concerns about your wound or dressing  You need to change an appointment  You have questions or concerns about your physical therapy  You patricia a refill on your medications. Please remember to leave adequate time to allow for a refill, particularly for pain medications. Nights and weekends in particular are difficult to obtain a refill.   You have a fever >101.5  You are having pain that is poorly controlled  You are having nausea, vomiting, or other symptoms that are minor   You are having a reaction to your medications (aside from facial swelling or trouble breathing, which is an emergency)    **Our office number is [phone]; the extension to leave a voicemail for our team is [phone]. If you are unable to speak with someone, please leave a message. **    **If you need to reach someone after hours (weekends or after 5PM on weekdays), please call [phone] and follow the prompts to reach the on call provider**    When to Go to the Emergency Department or Seek Emergency Care:   You have chest pain  You are having difficulty breathing  You are having difficulty swallowing, or feel your face or throat are swelling  You have weakness on one side of your body, have difficulty seeing, facial droop, or other signs of a stroke

## 2023-02-02 NOTE — PROGRESS NOTES
Problem: Mobility Impaired (Adult and Pediatric)  Goal: *Acute Goals and Plan of Care (Insert Text)  Outcome: Progressing Towards Goal  Note:   PHYSICAL THERAPY TREATMENT  Patient: Fadumo Hooper (54 y.o. female)  Date: 2/2/2023  Diagnosis: Other secondary osteoarthritis of left hip [M16.7] <principal problem not specified>  Procedure(s) (LRB):  LEFT ANTERIOR TOTAL HIP REPLACEMENT (Left) 1 Day Post-Op  Precautions:    Chart, physical therapy assessment, plan of care and goals were reviewed. ASSESSMENT  Patient continues with skilled PT services and is progressing towards goals. Pt reports good pain control, denies presyncopal symptoms with OOB activity. Progressing toward heel/toe gait pattern usig RW for steadying. /66 post activity. Will attempt stair training next during pm sesssion    Current Level of Function Impacting Discharge (mobility/balance): CGA    Other factors to consider for discharge:          PLAN :  Patient continues to benefit from skilled intervention to address the above impairments. Continue treatment per established plan of care. to address goals. Recommendation for discharge: (in order for the patient to meet his/her long term goals)  Outpatient physical therapy follow up recommended for strength. Balance, ROM    This discharge recommendation:  Has been made in collaboration with the attending provider and/or case management    IF patient discharges home will need the following DME: patient owns DME required for discharge       SUBJECTIVE:   Patient stated feeling good today.     OBJECTIVE DATA SUMMARY:   Critical Behavior:  Neurologic State: Alert  Orientation Level: Oriented X4  Cognition: Follows commands, Appropriate decision making, Appropriate safety awareness     Functional Mobility Training:  Bed Mobility:     Supine to Sit: Contact guard assistance; Additional time              Transfers:  Sit to Stand: Contact guard assistance  Stand to Sit: Contact guard assistance Balance:  Sitting: Intact  Standing: Intact; With support  Ambulation/Gait Training:  Distance (ft): 80 Feet (ft)  Assistive Device: Walker, rolling;Gait belt  Ambulation - Level of Assistance: Stand-by assistance                 Base of Support: Narrowed     Speed/Kylie: Pace decreased (<100 feet/min)                       Stairs:               Therapeutic Exercises:   SUPINE  EXERCISES   Sets   Reps   Active Active Assist   Passive Self ROM   Comments   Ankle Pumps   [x]                                        []                                        []                                        []                                           Quad Sets   [x]                                        []                                        []                                        []                                           Heel Slides   [x]                                        []                                        []                                        []                                           Hip Abduction   []                                        []                                        []                                        []                                           Glut Sets   [x]                                        []                                        []                                        []                                              []                                        []                                        []                                        []                                              []                                        []                                        []                                        []                                             STANDING  EXERCISES   Sets   Reps   Active Active Assist   Passive Self ROM   Comments   Heel Raises   [x]                                        [] []                                        []                                           Hip Abduction   []                                        []                                        []                                        []                                              []                                        []                                        []                                        []                                              []                                        []                                        []                                        []                                             Pain Rating:  3/10    Activity Tolerance:   Good      After treatment patient left in no apparent distress:   Sitting in chair and Call bell within reach    COMMUNICATION/COLLABORATION:   The patients plan of care was discussed with: Registered nurse.      Trudy Wilburn   Time Calculation: 26 mins

## 2023-02-02 NOTE — PROGRESS NOTES
OCCUPATIONAL THERAPY EVALUATION/Discharge  Patient: Parvez Cherry (19 y.o. female)  Date: 2/2/2023  Primary Diagnosis: Other secondary osteoarthritis of left hip [M16.7]  Procedure(s) (LRB):  LEFT ANTERIOR TOTAL HIP REPLACEMENT (Left) 1 Day Post-Op   Precautions: WBAT LLE no sudden or extreme movements       ASSESSMENT  Patient received seated in recliner A&OX4 and agreeable for OT eval/tx. Per pt report, pt lives in one story home with 3 steps bernarda rails to enter and is MI for self care and functional transfers/mobility. Patient educated on LB dressing techniques of surgery leg in first out last with pt verbalizing understanding. Patient stating use of dressing aids at baseline after back surgery in July 2023 with pt demonstrating use of dressing aids. Patient presents with decreased balance (intact with use of RW) and close to baseline for self care (SBA LB Dressing with dressing aids, SBA toileting/cloth mgmt simulated. SBA grooming standing sink, independent upper body dressing) and functional transfers/mobility (CGA sit<->stand and toilet transfer with RW and giat belt. Patient with no acute OT needs at this time thus will D/C from skilled OT services after eval.     Functional Outcome Measure: The patient scored Total: 85/100 on the Barthel Index outcome measure     Other factors to consider for discharge: time since onset, severity of deficits,, PLOF       PLAN :  Recommendations and Planned Interventions: self care training, functional mobility training, therapeutic exercise, balance training, therapeutic activities, endurance activities, patient education, and home safety training    Recommendation for discharge: (in order for the patient to meet his/her long term goals)  No skilled occupational therapy/ follow up rehabilitation needs identified at this time.     This discharge recommendation:  Has been made in collaboration with the attending provider and/or case management    IF patient discharges home will need the following DME: none       SUBJECTIVE:   Patient stated My daughter is going to stay with me and a friend of hers is going to as well.     OBJECTIVE DATA SUMMARY:   HISTORY:   Past Medical History:   Diagnosis Date    Anxiety     Bleeds easily ([address]     During Cataract sx had to have Vitamin K- never tested for genetic clotting disorder    High cholesterol     Hypertension     Hypothyroid     White coat syndrome with hypertension      Past Surgical History:   Procedure Laterality Date    HX ANKLE FRACTURE TX Right     HX CATARACT REMOVAL Bilateral     PT DENIES    HX HYSTERECTOMY      HX LUMBAR LAMINECTOMY  07/2022       Expanded or extensive additional review of patient history:     Home Situation  Home Environment: Private residence  # Steps to Enter: 3  Rails to Enter: Yes  Hand Rails : Bilateral  One/Two Story Residence: One story  Living Alone: Yes  Support Systems: Child(emery), Friend/Neighbor  Patient Expects to be Discharged to[de-identified] Home with home health  Current DME Used/Available at Home: Cane, straight, Walker, rollator, Raised toilet seat, Shower chair, Grab bars, Walker, rolling (sock aid, long handled shoe horn, long reacher, long sponge)  Tub or Shower Type: Shower    EXAMINATION OF PERFORMANCE DEFICITS:  Cognitive/Behavioral Status:  Neurologic State: Alert  Orientation Level: Oriented X4     Hearing: Auditory  Auditory Impairment: None    Range of Motion:  AROM: Within functional limits  PROM: Within functional limits     Strength:  Strength: Within functional limits (bernarda UE)        Balance:  Sitting: Intact  Standing: Intact; With support    Functional Mobility and Transfers for ADLs:  Bed Mobility:  Supine to Sit: Contact guard assistance; Additional time    Transfers:  Sit to Stand: Contact guard assistance  Stand to Sit: Contact guard assistance  Bed to Chair: Contact guard assistance  Bathroom Mobility: Contact guard assistance  Toilet Transfer : Contact guard assistance  Assistive Device : Gait Belt;Walker, rolling    ADL Assessment:     Oral Facial Hygiene/Grooming: Stand-by assistance (standing sink)    Upper Body Dressing: Independent    Lower Body Dressing: Stand-by assistance    Toileting: Stand by assistance     ADL Intervention and task modifications:     Grooming  Grooming Assistance: Stand-by assistance  Position Performed: Standing  Brushing Teeth: Stand-by assistance    Upper Body Dressing Assistance  Pullover Shirt: Independent    Lower Body Dressing Assistance  Underpants: Stand-by assistance  Pants With Elastic Waist: Stand-by assistance  Socks: Stand-by assistance  Slip on Shoes with Back: Stand-by assistance  Leg Crossed Method Used: No  Position Performed: Seated edge of bed;Standing  Adaptive Equipment Used: Reacher;Sock aid    Toileting  Toileting Assistance: Stand-by assistance  Bladder Hygiene: Stand-by assistance  Bowel Hygiene: Stand-by assistance  Clothing Management: Stand-by assistance    Functional Measure:    Barthel Index:  Bathin (would be able to with shower chair)  Bladder: 10  Bowels: 10  Groomin  Dressing: 10  Feeding: 10  Mobility: 10  Stairs: 5  Toilet Use: 10  Transfer (Bed to Chair and Back): 10  Total: 85/100      The Barthel ADL Index: Guidelines  1. The index should be used as a record of what a patient does, not as a record of what a patient could do. 2. The main aim is to establish degree of independence from any help, physical or verbal, however minor and for whatever reason. 3. The need for supervision renders the patient not independent. 4. A patient's performance should be established using the best available evidence. Asking the patient, friends/relatives and nurses are the usual sources, but direct observation and common sense are also important. However direct testing is not needed. 5. Usually the patient's performance over the preceding 24-48 hours is important, but occasionally longer periods will be relevant.   6. Middle categories imply that the patient supplies over 50 per cent of the effort. 7. Use of aids to be independent is allowed. Score Interpretation (from [facility])    Independent   60-79 Minimally independent   40-59 Partially dependent   20-39 Very dependent   <20 Totally dependent     -Arina Tenorio., Barthel, D.W. (1965). Functional evaluation: the Barthel Index. [address] ([address] (). The Barthel activities of daily living index: self-reporting versus actual performance in the old (> or = 75 years). Journal of [address] [place] [address] 45(7), [facility], J.ELIDAF, Paty Taveras., Triston William. (1999). Measuring the change in disability after inpatient rehabilitation; comparison of the responsiveness of the Barthel Index and Functional [place] Measure. Journal of Neurology, Neurosurgery, and Psychiatry, 66(4), 285-015. Sheila Hebert, [place].SUSAN, TEODORA Gotti, & Jean Claude Duran MBibiA. (2004) Assessment of post-stroke quality of life in cost-effectiveness studies: The usefulness of the Barthel Index and the EuroQoL-5D.  Quality of Life Research, 15, 375-85     Occupational Therapy Evaluation Charge Determination   History Examination Decision-Making   LOW Complexity : Brief history review  LOW Complexity : 1-3 performance deficits relating to physical, cognitive , or psychosocial skils that result in activity limitations and / or participation restrictions  LOW Complexity : No comorbidities that affect functional and no verbal or physical assistance needed to complete eval tasks       Based on the above components, the patient evaluation is determined to be of the following complexity level: LOW   Pain Ratin/10 pain left hip    Activity Tolerance:   Good    After treatment patient left in no apparent distress:    Sitting in chair and Call bell within reach    COMMUNICATION/EDUCATION:   The patients plan of care was discussed with: Physical therapy assistant and Registered nurse. Home safety education was provided and the patient/caregiver indicated understanding. and Patient/family have participated as able in goal setting and plan of care. This patients plan of care is appropriate for delegation to [facility].     Thank you for this referral.  Dawood Rae  Time Calculation: 22 mins

## 2023-02-02 NOTE — PROGRESS NOTES
2/2/23  9:33 AM    CM attempted to meet with 2x, working with therapy. Will attempt again later. Samuel Don    10:11 AM    Care Management Initial Evaluation:    Reason for Admission:    1. Other secondary osteoarthritis of left hip                         RUR Score:    N/A- obs    Medicare Outpatient Observation Notice (MOON)/ [place] Outpatient Observation Notice (Vergie Gram) provided to patient/representative with verbal explanation of the notice. Time allotted for questions regarding the notice. Patient /representative provided a completed copy of the MOON/VOON notice. Copy placed on bedside chart. Plan for utilizing home health:    Used [facility] at last admission/surgery- would like to use them again      PCP: First and Last name:  Miguel Esquivel MD     Name of Practice:    Are you a current patient: Yes/No: Yes   Approximate date of last visit: within the last two weeks   Can you participate in a virtual visit with your PCP:                     Current Advanced Directive/Advance Care Plan: Full Code      Healthcare Decision Maker:   Click here to complete [address] including selection of the Healthcare Decision Maker Relationship (ie \"Primary\")           Chun Epps Genera- [phone]                  Transition of Care Plan:                    Patient lives alone in a one level home with 3 exterior steps. She has access to a RW, shower chair, can and has a raised toilet seat. She is normally independent with all ADL's and drives. She has a friend who will stay with her for two weeks and then her daughter will stay at night. She anticipates going home with home health as she lives alone and cannot drive for 6 weeks. She had home health following her last surgery in 2022. 1). Patient admitted for planned surgery  2). PT/OT evals  3). Likely home with home health- prefers [facility] [address]  4). Family will transport at dc  5).  CM following for dc needs    Uzair Hollins [address] Management Interventions  PCP Verified by CM: Yes (withi the last two weeks)  Mode of Transport at Discharge:  Other (see comment) (Friend will transport at [employer])  Transition of Care Consult (CM Consult): Discharge Planning  Discharge Durable Medical Equipment: No  Physical Therapy Consult: Yes  Occupational Therapy Consult: Yes  Speech Therapy Consult: No  Support Systems: Child(emery), Friend/Neighbor  Confirm Follow Up Transport: Friends  Discharge Location  Patient Expects to be Discharged to[de-identified] Home with home health

## 2023-02-02 NOTE — FACE TO FACE
The patient was provided a virtual link to view the pre-operative Joint Replacement Class Video  A Patient Education Book specific to total hip or knee joint replacement surgery was given to the patient in [facility]. The content of the class was presented using an audio power point presentation specific for patients undergoing total hip and knee replacement surgery. Incentive spirometer and CHG bath kits were verbally reviewed. Day of surgery routine and expectations, hospital routine and expectations, nutrition, alcohol, nicotine, medications, infection control, pain management, DVT precautions and equipment, ice therapy, durable medical equipment, exercises, mobility expectations and precautions, home preparation and safety were reviewed in class video. My contact information was shared with the patient to provide further information as requested by the patient related to their upcoming surgery.    Received confirmation that the patient viewed joint class online prior to surgery

## 2023-02-02 NOTE — PROGRESS NOTES
Problem: Mobility Impaired (Adult and Pediatric)  Goal: *Acute Goals and Plan of Care (Insert Text)  2/2/2023 1119 by Toby Jennings  Outcome: Progressing Towards Goal  Note:   PHYSICAL THERAPY TREATMENT  Patient: Cephus Najjar (65 y.o. female)  Date: 2/2/2023  Diagnosis: Other secondary osteoarthritis of left hip [M16.7] <principal problem not specified>  Procedure(s) (LRB):  LEFT ANTERIOR TOTAL HIP REPLACEMENT (Left) 1 Day Post-Op  Precautions:    Chart, physical therapy assessment, plan of care and goals were reviewed. ASSESSMENT  Patient continues with skilled PT services and progressing towards goals. Requires CGA for functional transfers and gait training on level surfaces using RW for steadying. Ascend/ descend 6 steps using B handrail support. No knee buckling noted. Reviewed hip precautions and proper car transfers. Pt is cleared for discharge from PT standpoint RN notified    Current Level of Function Impacting Discharge (mobility/balance): CGA    Other factors to consider for discharge:          PLAN :  Patient continues to benefit from skilled intervention to address the above impairments. Continue treatment per established plan of care. to address goals. Recommendation for discharge: (in order for the patient to meet his/her long term goals)  Physical therapy at least 2 days/week in the home     This discharge recommendation:  Has been made in collaboration with the attending provider and/or case management    IF patient discharges home will need the following DME: patient owns DME required for discharge       SUBJECTIVE:   Patient stated just tried.     OBJECTIVE DATA SUMMARY:   Critical Behavior:  Neurologic State: Alert  Orientation Level: Oriented X4  Cognition: Follows commands, Appropriate decision making, Appropriate safety awareness     Functional Mobility Training:  Bed Mobility:     Supine to Sit: Contact guard assistance; Additional time              Transfers:  Sit to Stand: Contact guard assistance  Stand to Sit: Contact guard assistance        Bed to Chair: Contact guard assistance                    Balance:  Sitting: Intact  Standing: Intact; With support  Ambulation/Gait Training:  Distance (ft): 80 Feet (ft)  Assistive Device: Walker, rolling;Gait belt  Ambulation - Level of Assistance: Stand-by assistance                 Base of Support: Narrowed     Speed/Kylie: Pace decreased (<100 feet/min)                       Stairs:  Number of Stairs Trained: 6  Stairs - Level of Assistance: Contact guard assistance   Rail Use: Both    Therapeutic Exercises:     Pain Ratin/10    Activity Tolerance:   Good    After treatment patient left in no apparent distress:   Sitting in chair and Call bell within reach    COMMUNICATION/COLLABORATION:   The patients plan of care was discussed with: Registered nurseBibi Katz   Time Calculation: 20 mins          2023 0920 by Yun Cannon  Outcome: Progressing Towards Goal  Note:   PHYSICAL THERAPY TREATMENT  Patient: Chico Mathis (2 y.o. female)  Date: 2023  Diagnosis: Other secondary osteoarthritis of left hip [M16.7] <principal problem not specified>  Procedure(s) (LRB):  LEFT ANTERIOR TOTAL HIP REPLACEMENT (Left) 1 Day Post-Op  Precautions:    Chart, physical therapy assessment, plan of care and goals were reviewed. ASSESSMENT  Patient continues with skilled PT services and is progressing towards goals. Pt reports good pain control, denies presyncopal symptoms with OOB activity. Progressing toward heel/toe gait pattern usig RW for steadying. /66 post activity. Will attempt stair training next during pm sesssion    Current Level of Function Impacting Discharge (mobility/balance): CGA    Other factors to consider for discharge:          PLAN :  Patient continues to benefit from skilled intervention to address the above impairments. Continue treatment per established plan of care.   to address goals. Recommendation for discharge: (in order for the patient to meet his/her long term goals)  Outpatient physical therapy follow up recommended for strength. Balance, ROM    This discharge recommendation:  Has been made in collaboration with the attending provider and/or case management    IF patient discharges home will need the following DME: patient owns DME required for discharge       SUBJECTIVE:   Patient stated feeling good today.     OBJECTIVE DATA SUMMARY:   Critical Behavior:  Neurologic State: Alert  Orientation Level: Oriented X4  Cognition: Follows commands, Appropriate decision making, Appropriate safety awareness     Functional Mobility Training:  Bed Mobility:     Supine to Sit: Contact guard assistance; Additional time              Transfers:  Sit to Stand: Contact guard assistance  Stand to Sit: Contact guard assistance                             Balance:  Sitting: Intact  Standing: Intact; With support  Ambulation/Gait Training:  Distance (ft): 80 Feet (ft)  Assistive Device: Walker, rolling;Gait belt  Ambulation - Level of Assistance: Stand-by assistance                 Base of Support: Narrowed     Speed/Kylie: Pace decreased (<100 feet/min)                       Stairs:               Therapeutic Exercises:   SUPINE  EXERCISES   Sets   Reps   Active Active Assist   Passive Self ROM   Comments   Ankle Pumps   [x]                                        []                                        []                                        []                                           Quad Sets   [x]                                        []                                        []                                        []                                           Heel Slides   [x]                                        []                                        []                                        []                                           Hip Abduction   [] []                                        []                                        []                                           Glut Sets   [x]                                        []                                        []                                        []                                              []                                        []                                        []                                        []                                              []                                        []                                        []                                        []                                             STANDING  EXERCISES   Sets   Reps   Active Active Assist   Passive Self ROM   Comments   Heel Raises   [x]                                        []                                        []                                        []                                           Hip Abduction   []                                        []                                        []                                        []                                              []                                        []                                        []                                        []                                              []                                        []                                        []                                        []                                             Pain Rating:  3/10    Activity Tolerance:   Good      After treatment patient left in no apparent distress:   Sitting in chair and Call bell within reach    COMMUNICATION/COLLABORATION:   The patients plan of care was discussed with: Registered nurse.      John Cooper   Time Calculation: 26 mins

## 2023-02-02 NOTE — PROGRESS NOTES
Problem: Falls - Risk of  Goal: *Absence of Falls  Description: Document [place] Fail Fall Risk and appropriate interventions in the flowsheet.   Outcome: Progressing Towards Goal  Note: Fall Risk Interventions:                                Problem: Patient Education: Go to Patient Education Activity  Goal: Patient/Family Education  Outcome: Progressing Towards Goal     Problem: Pain  Goal: *Control of Pain  Outcome: Progressing Towards Goal     Problem: General Medical Care Plan  Goal: *Vital signs within specified parameters  Outcome: Progressing Towards Goal     Problem: General Medical Care Plan  Goal: *Labs within defined limits  Outcome: Progressing Towards Goal     Problem: General Medical Care Plan  Goal: *Absence of infection signs and symptoms  Outcome: Progressing Towards Goal     Problem: General Medical Care Plan  Goal: *Optimal pain control at patient's stated goal  Outcome: Progressing Towards Goal     Problem: General Medical Care Plan  Goal: *Skin integrity maintained  Outcome: Progressing Towards Goal     Problem: General Medical Care Plan  Goal: *Fluid volume balance  Outcome: Progressing Towards Goal     Problem: General Medical Care Plan  Goal: *Anxiety reduced or absent  Outcome: Progressing Towards Goal     Problem: General Medical Care Plan  Goal: *Progressive mobility and function (eg: ADL's)  Outcome: Progressing Towards Goal

## 2023-02-03 ENCOUNTER — PATIENT OUTREACH (OUTPATIENT)
Dept: CASE MANAGEMENT | Age: 83
End: 2023-02-03

## 2023-02-03 NOTE — PROGRESS NOTES
Care Transitions Initial Call    Call within 2 business days of discharge: Yes     Patient: Raisa Christine Patient : 1940 MRN: [id number]    Last Discharge [address]       Date Complaint Diagnosis Description Type Department Provider    23  Other secondary osteoarthritis of left hip Admission (Discharged) Kendell Staples MD            Was this an external facility discharge? No Discharge Facility: [facility]    Challenges to be reviewed by the provider   Additional needs identified to be addressed with provider: no  none         Method of communication with provider : none    Advance Care Planning:   Does patient have an Advance Directive: yes, reviewed and current. Inpatient Readmission Risk score: Unplanned Readmit Risk Score: 4.2    Was this a readmission? no   Patient stated reason for the admission: Left anterior total hip replacemnt    Patients top risk factors for readmission: falls   Interventions to address risk factors: Scheduled appointment with Specialist-23 and Obtained and reviewed discharge summary and/or continuity of care documents    Care Transition Nurse (CTN) contacted the patient by telephone to perform post hospital discharge assessment. Verified name and  with patient as identifiers. Provided introduction to self, and explanation of the CTN role. CTN reviewed discharge instructions, medical action plan and red flags with patient who verbalized understanding. Were discharge instructions available to patient? yes. Reviewed appropriate site of care based on symptoms and resources available to patient including: PCP and Specialist. Patient given an opportunity to ask questions and does not have any further questions or concerns at this time. The patient agrees to contact the PCP office for questions related to their healthcare. Medication reconciliation was performed with patient, who verbalizes understanding of administration of home medications.  Advised obtaining a 90-day supply of all daily and as-needed medications. Referral to Pharm D needed: no     Home Health/Outpatient orders at discharge: home health care  [address]: At [facility]  Date of initial visit: TBD    [facility] ordered at discharge: None  [address] received: n/a    Was patient discharged with a pulse oximeter? no    Discussed follow-up appointments. If no appointment was previously scheduled, appointment scheduling offered:  n/a . Is follow up appointment scheduled within 7 days of discharge? Follow up per discharge instructions  . [facility] follow up appointment(s): No future appointments. Non-[facility] follow up appointment(s): Dr. Aline Boo follow up on 2/17/23    Plan for follow-up call in 5-7 days based on severity of symptoms and risk factors. Plan for next call: symptom management-pain and activity  CTN provided contact information for future needs. Goals Addressed                   This Visit's Progress     Attends follow-up appointments as directed. [facility] 2/1/23-2/2/23 Left Ant Total Hip Replacement  At  [facility] will contact to set up initial [facility] visit for admission. Advised to call if not contacted within 24 hours. Patient picked prescriptions per discharge instructions. Medication reconciliation completed. Discussed red flags and appropriate provider for medical or surgical related questions. Ortho post op visit previously scheduled on 2/17/23. Will discuss incision, activity and progress at next outreach.

## 2023-02-08 ENCOUNTER — PATIENT OUTREACH (OUTPATIENT)
Dept: CASE MANAGEMENT | Age: 83
End: 2023-02-08

## 2023-02-08 NOTE — PROGRESS NOTES
Care Transitions Follow Up Call    Challenges to be reviewed by the provider   Additional needs identified to be addressed with provider: no  none           Method of communication with provider : none    Care Transition Nurse (CTN) contacted the patient by telephone to follow up after admission on 23. Verified name and  with patient as identifiers. Addressed changes since last contact: home health care- initial visit 23  Follow up appointment completed? no.   Was follow up appointment scheduled within 7 days of discharge? No-post op follow up on 23 . Advance Care Planning:   Does patient have an Advance Directive:  yes; reviewed and current     CTN reviewed discharge instructions, medical action plan and red flags with patient and discussed any barriers to care and/or understanding of plan of care after discharge. Discussed appropriate site of care based on symptoms and resources available to patient including: PCP and Specialist. The patient agrees to contact the PCP office for questions related to their healthcare. Patients top risk factors for readmission: falls and post op red flags. Interventions to address risk factors: Scheduled appointment with Specialist-23 and Education of patient/family/caregiver/guardian to support self-management-incision intact, swelling decreased with ice and elevation and Miralax for constipation for opioids. [facility] follow up appointment(s): No future appointments. Non-[facility] follow up appointment(s): Ortho follow up 23. CTN provided contact information for future needs. Plan for follow-up call in 7-10 days based on severity of symptoms and risk factors. Plan for next call: follow up appointment-Attended appointment and activity progression       Goals Addressed                   This Visit's Progress     Attends follow-up appointments as directed.         [facility] 23-23 Left Ant Total Hip Replacement  At [facility] will contact to set up initial [facility] visit for admission. Advised to call if not contacted within 24 hours. Patient picked prescriptions per discharge instructions. Medication reconciliation completed. Discussed red flags and appropriate provider for medical or surgical related questions. Ortho post op visit previously scheduled on 2/17/23. Will discuss incision, activity and progress at next outreach. At [address] [place] [address] initial visit 2/4/23.

## 2023-02-23 ENCOUNTER — PATIENT OUTREACH (OUTPATIENT)
Dept: CASE MANAGEMENT | Age: 83
End: 2023-02-23

## 2023-02-23 NOTE — PROGRESS NOTES
Care Transitions Follow Up Call    Patient Current Location: [place]    Challenges to be reviewed by the provider   Additional needs identified to be addressed with provider: no  none           Method of communication with provider : none    Care Transition Nurse (CTN) contacted the patient by telephone to follow up after admission on 23. Verified name and  with patient as identifiers. Addressed changes since last contact: home health care- completed therapy  Follow up appointment completed? yes. Was follow up appointment scheduled within 7 days of discharge? Post op follow up on 23 per discharge instructions . Advance Care Planning:   Does patient have an Advance Directive:  yes; reviewed and current     CTN reviewed discharge instructions, medical action plan and red flags with patient and discussed any barriers to care and/or understanding of plan of care after discharge. Discussed appropriate site of care based on symptoms and resources available to patient including: Specialist. The patient agrees to contact the PCP office for questions related to their healthcare. Patients top risk factors for readmission: falls   Interventions to address risk factors: Obtained and reviewed discharge summary and/or continuity of care documents    [facility] follow up appointment(s): No future appointments. Non-[facility] follow up appointment(s): n/a    CTN provided contact information for future needs. Plan for follow-up call in 10-14 days based on severity of symptoms and risk factors. Plan for next call: self management-Graduation       Goals Addressed                   This Visit's Progress     Attends follow-up appointments as directed. [facility] 23-23 Left Ant Total Hip Replacement  At [address] [facility] contact [facility]. Advised to call if not contacted within 24 hours. Patient picked prescriptions per discharge instructions. Medication reconciliation completed. Discussed red flags and appropriate provider for medical or surgical related questions. Ortho post op visit previously scheduled on 2/17/23. Will discuss incision, activity and progress at next outreach. At [address] initial visit 2/4/23. Patient attended follow up appointment. HH completed. Walking without any assistive devices and received permission to drive.

## 2023-03-10 ENCOUNTER — PATIENT OUTREACH (OUTPATIENT)
Dept: CASE MANAGEMENT | Age: 83
End: 2023-03-10

## 2023-03-10 NOTE — PROGRESS NOTES
Patient has graduated from the Transitions of Care Coordination  program on 03/10/23 . Patient/family has the ability to self-manage at this time Care management goals have been completed. Patient was not referred to the [facility] team for further management. Goals Addressed                   This Visit's Progress     Attends follow-up appointments as directed. [facility] 2/1/23-2/2/23 Left Ant Total Hip Replacement  At [address] will contact to set up initial [facility] visit for admission. Advised to call if not contacted within 24 hours. Patient picked prescriptions per discharge instructions. Medication reconciliation completed. Discussed red flags and appropriate provider for medical or surgical related questions. Ortho post op visit previously scheduled on 2/17/23. Will discuss incision, activity and progress at next outreach. At [address] initial visit 2/4/23. Patient attended follow up appointment. HH completed. Walking without any assistive devices and received permission to drive. Patient has Care Transition Nurse's contact information for any further questions, concerns, or needs. Patients upcoming visits:  No future appointments.

## 2023-07-12 ENCOUNTER — HOSPITAL ENCOUNTER (OUTPATIENT)
Facility: HOSPITAL | Age: 83
Discharge: HOME | End: 2023-07-15
Payer: MEDICARE

## 2023-07-12 DIAGNOSIS — M81.0 AGE-RELATED OSTEOPOROSIS WITHOUT CURRENT PATHOLOGICAL FRACTURE: ICD-10-CM

## 2023-07-12 PROCEDURE — 77080 DXA BONE DENSITY AXIAL: CPT

## 2023-11-25 NOTE — H&P
Nicky Junior was referred for evaluation by:Dr. Dc Munoz for Pre- Op Evaluation. Please see encounter details and orders for consultative summary. Type of surgery : Right Anterior Total Hip Replacement  Surgery site : Right hip  Anesthesia type: Spinal  Date of procedure:  12/06/2023    This 82y.o. year old female presents with complaints of right hip pain for past year. She had left total hip replacement in 2/2023 and has been doing well. Conservative measures including medication management, activity modification, physical therapy and injection therapy have been exhausted prior to the decision for surgery. Patient has discussed the risks, alternatives, and benefits of the surgery with surgeon and has elected to proceed with surgical intervention. Reports that she had issues with excessive bleeding with her blepharoplasty which required use of Vitamin K. Denies any clotting or bleeding issues with lumbar fusion in 7/2022 or left total hip replacement in 2/2023. Allergies: Allergies   Allergen Reactions    Latex Hives    Iodinated Contrast Media Anaphylaxis and Hives    Iodine Hives    Adhesive Tape Rash     blistering     Latex allergy: yes  Prior reactions to anesthesia:  PONV     Current Outpatient Medications   Medication Sig    TURMERIC-GINGER PO Take by mouth in the morning and at bedtime    Multiple Vitamin (MULTIVITAMIN ADULT PO) Take by mouth every morning    Multiple Vitamins-Minerals (HAIR SKIN AND NAILS FORMULA PO) Take by mouth every evening    ibuprofen (ADVIL;MOTRIN) 200 MG tablet Take 2 tablets by mouth as needed for Pain    lisinopril (PRINIVIL;ZESTRIL) 20 MG tablet Take 1 tablet by mouth every evening    acetaminophen (TYLENOL) 650 MG extended release tablet Take 650 mg by mouth in the morning and 650 mg at noon and 650 mg in the evening.     amLODIPine (NORVASC) 5 MG tablet Take 1 tablet by mouth every morning    fenofibrate (TRIGLIDE) 160 MG tablet Take 1 tablet by mouth every

## 2023-11-28 ENCOUNTER — HOSPITAL ENCOUNTER (OUTPATIENT)
Facility: HOSPITAL | Age: 83
Discharge: HOME | End: 2023-12-01
Payer: MEDICARE

## 2023-11-28 VITALS
SYSTOLIC BLOOD PRESSURE: 156 MMHG | BODY MASS INDEX: 29.55 KG/M2 | DIASTOLIC BLOOD PRESSURE: 74 MMHG | HEART RATE: 71 BPM | WEIGHT: 160.6 LBS | TEMPERATURE: 97.1 F | RESPIRATION RATE: 16 BRPM | HEIGHT: 62 IN | OXYGEN SATURATION: 97 %

## 2023-11-28 LAB
ABO + RH BLD: NORMAL
ALBUMIN SERPL-MCNC: 4.6 G/DL (ref 3.5–5)
ALBUMIN/GLOB SERPL: 1.3 (ref 1.1–2.2)
ALP SERPL-CCNC: 68 U/L (ref 45–117)
ALT SERPL-CCNC: 32 U/L (ref 12–78)
ANION GAP SERPL CALC-SCNC: 6 MMOL/L (ref 5–15)
APPEARANCE UR: CLEAR
AST SERPL-CCNC: 29 U/L (ref 15–37)
BACTERIA URNS QL MICRO: NEGATIVE /HPF
BASOPHILS # BLD: 0.1 K/UL (ref 0–0.1)
BASOPHILS NFR BLD: 1 % (ref 0–1)
BILIRUB SERPL-MCNC: 0.3 MG/DL (ref 0.2–1)
BILIRUB UR QL: NEGATIVE
BLOOD GROUP ANTIBODIES SERPL: NORMAL
BUN SERPL-MCNC: 39 MG/DL (ref 6–20)
BUN/CREAT SERPL: 28 (ref 12–20)
CALCIUM SERPL-MCNC: 9.9 MG/DL (ref 8.5–10.1)
CHLORIDE SERPL-SCNC: 106 MMOL/L (ref 97–108)
CO2 SERPL-SCNC: 28 MMOL/L (ref 21–32)
COLOR UR: NORMAL
CREAT SERPL-MCNC: 1.38 MG/DL (ref 0.55–1.02)
DIFFERENTIAL METHOD BLD: NORMAL
EOSINOPHIL # BLD: 0.2 K/UL (ref 0–0.4)
EOSINOPHIL NFR BLD: 4 % (ref 0–7)
EPITH CASTS URNS QL MICRO: NORMAL /LPF
ERYTHROCYTE [DISTWIDTH] IN BLOOD BY AUTOMATED COUNT: 13.5 % (ref 11.5–14.5)
EST. AVERAGE GLUCOSE BLD GHB EST-MCNC: 123 MG/DL
GLOBULIN SER CALC-MCNC: 3.6 G/DL (ref 2–4)
GLUCOSE SERPL-MCNC: 101 MG/DL (ref 65–100)
GLUCOSE UR STRIP.AUTO-MCNC: NEGATIVE MG/DL
HBA1C MFR BLD: 5.9 % (ref 4–5.6)
HCT VFR BLD AUTO: 38.5 % (ref 35–47)
HGB BLD-MCNC: 12.5 G/DL (ref 11.5–16)
HGB UR QL STRIP: NEGATIVE
HYALINE CASTS URNS QL MICRO: NORMAL /LPF (ref 0–2)
IMM GRANULOCYTES # BLD AUTO: 0 K/UL (ref 0–0.04)
IMM GRANULOCYTES NFR BLD AUTO: 0 % (ref 0–0.5)
KETONES UR QL STRIP.AUTO: NEGATIVE MG/DL
LEUKOCYTE ESTERASE UR QL STRIP.AUTO: NEGATIVE
LYMPHOCYTES # BLD: 1.5 K/UL (ref 0.8–3.5)
LYMPHOCYTES NFR BLD: 27 % (ref 12–49)
MCH RBC QN AUTO: 28 PG (ref 26–34)
MCHC RBC AUTO-ENTMCNC: 32.5 G/DL (ref 30–36.5)
MCV RBC AUTO: 86.3 FL (ref 80–99)
MONOCYTES # BLD: 0.4 K/UL (ref 0–1)
MONOCYTES NFR BLD: 8 % (ref 5–13)
NEUTS SEG # BLD: 3.3 K/UL (ref 1.8–8)
NEUTS SEG NFR BLD: 60 % (ref 32–75)
NITRITE UR QL STRIP.AUTO: NEGATIVE
NRBC # BLD: 0 K/UL (ref 0–0.01)
NRBC BLD-RTO: 0 PER 100 WBC
PH UR STRIP: 6 (ref 5–8)
PLATELET # BLD AUTO: 311 K/UL (ref 150–400)
PMV BLD AUTO: 10.7 FL (ref 8.9–12.9)
POTASSIUM SERPL-SCNC: 4.7 MMOL/L (ref 3.5–5.1)
PROT SERPL-MCNC: 8.2 G/DL (ref 6.4–8.2)
PROT UR STRIP-MCNC: NEGATIVE MG/DL
RBC # BLD AUTO: 4.46 M/UL (ref 3.8–5.2)
RBC #/AREA URNS HPF: NORMAL /HPF (ref 0–5)
SODIUM SERPL-SCNC: 140 MMOL/L (ref 136–145)
SP GR UR REFRACTOMETRY: 1.01 (ref 1–1.03)
SPECIMEN EXP DATE BLD: NORMAL
URINE CULTURE IF INDICATED: NORMAL
UROBILINOGEN UR QL STRIP.AUTO: 0.2 EU/DL (ref 0.2–1)
WBC # BLD AUTO: 5.4 K/UL (ref 3.6–11)
WBC URNS QL MICRO: NORMAL /HPF (ref 0–4)

## 2023-11-28 PROCEDURE — 86901 BLOOD TYPING SEROLOGIC RH(D): CPT

## 2023-11-28 PROCEDURE — 36415 COLL VENOUS BLD VENIPUNCTURE: CPT

## 2023-11-28 PROCEDURE — APPNB30 APP NON BILLABLE TIME 0-30 MINS: Performed by: NURSE PRACTITIONER

## 2023-11-28 PROCEDURE — 93005 ELECTROCARDIOGRAM TRACING: CPT | Performed by: ORTHOPAEDIC SURGERY

## 2023-11-28 PROCEDURE — 85025 COMPLETE CBC W/AUTO DIFF WBC: CPT

## 2023-11-28 PROCEDURE — 86850 RBC ANTIBODY SCREEN: CPT

## 2023-11-28 PROCEDURE — 83036 HEMOGLOBIN GLYCOSYLATED A1C: CPT

## 2023-11-28 PROCEDURE — 81001 URINALYSIS AUTO W/SCOPE: CPT

## 2023-11-28 PROCEDURE — 86900 BLOOD TYPING SEROLOGIC ABO: CPT

## 2023-11-28 PROCEDURE — 80053 COMPREHEN METABOLIC PANEL: CPT

## 2023-11-28 RX ORDER — LISINOPRIL 20 MG/1
20 TABLET ORAL EVERY EVENING
COMMUNITY

## 2023-11-28 RX ORDER — IBUPROFEN 200 MG
400 TABLET ORAL AS NEEDED
COMMUNITY

## 2023-11-28 ASSESSMENT — ENCOUNTER SYMPTOMS
BLOOD IN STOOL: 0
TROUBLE SWALLOWING: 0
VOMITING: 0
SHORTNESS OF BREATH: 0
COUGH: 0
SORE THROAT: 0
ABDOMINAL PAIN: 0
WHEEZING: 0
NAUSEA: 0

## 2023-11-29 PROBLEM — M16.11 PRIMARY OSTEOARTHRITIS OF RIGHT HIP: Status: ACTIVE | Noted: 2023-11-29

## 2023-11-29 LAB
BACTERIA SPEC CULT: NORMAL
BACTERIA SPEC CULT: NORMAL
EKG ATRIAL RATE: 65 BPM
EKG DIAGNOSIS: NORMAL
EKG P AXIS: 57 DEGREES
EKG P-R INTERVAL: 184 MS
EKG Q-T INTERVAL: 424 MS
EKG QRS DURATION: 86 MS
EKG QTC CALCULATION (BAZETT): 440 MS
EKG R AXIS: 11 DEGREES
EKG T AXIS: 46 DEGREES
EKG VENTRICULAR RATE: 65 BPM
SERVICE CMNT-IMP: NORMAL

## 2023-11-29 PROCEDURE — 93010 ELECTROCARDIOGRAM REPORT: CPT | Performed by: SPECIALIST

## 2023-12-05 ENCOUNTER — ANESTHESIA EVENT (OUTPATIENT)
Facility: HOSPITAL | Age: 83
End: 2023-12-05
Payer: MEDICARE

## 2023-12-05 NOTE — ANESTHESIA PRE PROCEDURE
Department of Anesthesiology  Preprocedure Note       Name:  Yael Blocker   Age:  82 y.o.  :  1940                                          MRN:  [id number]         Date:  2023      Surgeon: Ernst Lanes):  Payton Hurley MD    Procedure: Procedure(s):  RIGHT ANTERIOR TOTAL HIP REPLACEMENT    Medications prior to admission:   Prior to Admission medications    Medication Sig Start Date End Date Taking? Authorizing Provider   TURMERIC-GINGER PO Take by mouth in the morning and at bedtime    Edie Kimball MD   Multiple Vitamin (MULTIVITAMIN ADULT PO) Take by mouth every morning    Edie Kimball MD   Multiple Vitamins-Minerals (HAIR SKIN AND NAILS FORMULA PO) Take by mouth every evening    Edie Kimball MD   ibuprofen (ADVIL;MOTRIN) 200 MG tablet Take 2 tablets by mouth as needed for Pain    Edie Kimball MD   lisinopril (PRINIVIL;ZESTRIL) 20 MG tablet Take 1 tablet by mouth every evening    Edie Kimball MD   acetaminophen (TYLENOL) 650 MG extended release tablet Take 650 mg by mouth in the morning and 650 mg at noon and 650 mg in the evening. 23   Automatic Reconciliation, Ar   amLODIPine (NORVASC) 5 MG tablet Take 1 tablet by mouth every morning    Automatic Reconciliation, Ar   fenofibrate (TRIGLIDE) 160 MG tablet Take 1 tablet by mouth every morning    Automatic Reconciliation, Ar   levothyroxine (SYNTHROID) 112 MCG tablet Take 112 mcg by mouth every morning (before breakfast)    Automatic Reconciliation, Ar   loratadine (CLARITIN) 10 MG tablet Take 10 mg by mouth every evening    Automatic Reconciliation, Ar   rosuvastatin (CRESTOR) 20 MG tablet Take 20 mg by mouth nightly    Automatic Reconciliation, Ar   sertraline (ZOLOFT) 100 MG tablet Take 100 mg by mouth every evening    Automatic Reconciliation, Ar       Current medications:    No current facility-administered medications for this encounter.      Current Outpatient Medications   Medication Sig Dispense

## 2023-12-06 ENCOUNTER — ANESTHESIA (OUTPATIENT)
Facility: HOSPITAL | Age: 83
End: 2023-12-06
Payer: MEDICARE

## 2023-12-06 ENCOUNTER — APPOINTMENT (OUTPATIENT)
Facility: HOSPITAL | Age: 83
End: 2023-12-06
Attending: ORTHOPAEDIC SURGERY
Payer: MEDICARE

## 2023-12-06 ENCOUNTER — HOSPITAL ENCOUNTER (OUTPATIENT)
Facility: HOSPITAL | Age: 83
Discharge: HOME | End: 2023-12-07
Attending: ORTHOPAEDIC SURGERY | Admitting: ORTHOPAEDIC SURGERY
Payer: MEDICARE

## 2023-12-06 DIAGNOSIS — Z96.641 S/P TOTAL RIGHT HIP ARTHROPLASTY: Primary | ICD-10-CM

## 2023-12-06 PROBLEM — Z96.649 STATUS POST TOTAL REPLACEMENT OF HIP, UNSPECIFIED LATERALITY: Status: ACTIVE | Noted: 2023-12-06

## 2023-12-06 PROCEDURE — 6370000000 HC RX 637 (ALT 250 FOR IP): Performed by: ORTHOPAEDIC SURGERY

## 2023-12-06 PROCEDURE — 6370000000 HC RX 637 (ALT 250 FOR IP): Performed by: STUDENT IN AN ORGANIZED HEALTH CARE EDUCATION/TRAINING PROGRAM

## 2023-12-06 PROCEDURE — 6360000002 HC RX W HCPCS: Performed by: STUDENT IN AN ORGANIZED HEALTH CARE EDUCATION/TRAINING PROGRAM

## 2023-12-06 PROCEDURE — 6360000002 HC RX W HCPCS: Performed by: ORTHOPAEDIC SURGERY

## 2023-12-06 PROCEDURE — 2720000010 HC SURG SUPPLY STERILE: Performed by: ORTHOPAEDIC SURGERY

## 2023-12-06 PROCEDURE — 3600000015 HC SURGERY LEVEL 5 ADDTL 15MIN: Performed by: ORTHOPAEDIC SURGERY

## 2023-12-06 PROCEDURE — 2500000003 HC RX 250 WO HCPCS: Performed by: NURSE ANESTHETIST, CERTIFIED REGISTERED

## 2023-12-06 PROCEDURE — 97161 PT EVAL LOW COMPLEX 20 MIN: CPT

## 2023-12-06 PROCEDURE — 7100000000 HC PACU RECOVERY - FIRST 15 MIN: Performed by: ORTHOPAEDIC SURGERY

## 2023-12-06 PROCEDURE — 2580000003 HC RX 258: Performed by: NURSE ANESTHETIST, CERTIFIED REGISTERED

## 2023-12-06 PROCEDURE — 72170 X-RAY EXAM OF PELVIS: CPT

## 2023-12-06 PROCEDURE — 3700000000 HC ANESTHESIA ATTENDED CARE: Performed by: ORTHOPAEDIC SURGERY

## 2023-12-06 PROCEDURE — 64447 NJX AA&/STRD FEMORAL NRV IMG: CPT | Performed by: ANESTHESIOLOGY

## 2023-12-06 PROCEDURE — 2709999900 HC NON-CHARGEABLE SUPPLY: Performed by: ORTHOPAEDIC SURGERY

## 2023-12-06 PROCEDURE — 3700000001 HC ADD 15 MINUTES (ANESTHESIA): Performed by: ORTHOPAEDIC SURGERY

## 2023-12-06 PROCEDURE — 7100000001 HC PACU RECOVERY - ADDTL 15 MIN: Performed by: ORTHOPAEDIC SURGERY

## 2023-12-06 PROCEDURE — 6360000002 HC RX W HCPCS: Performed by: NURSE ANESTHETIST, CERTIFIED REGISTERED

## 2023-12-06 PROCEDURE — 6360000002 HC RX W HCPCS: Performed by: ANESTHESIOLOGY

## 2023-12-06 PROCEDURE — 2580000003 HC RX 258: Performed by: STUDENT IN AN ORGANIZED HEALTH CARE EDUCATION/TRAINING PROGRAM

## 2023-12-06 PROCEDURE — 6370000000 HC RX 637 (ALT 250 FOR IP): Performed by: ANESTHESIOLOGY

## 2023-12-06 PROCEDURE — C1776 JOINT DEVICE (IMPLANTABLE): HCPCS | Performed by: ORTHOPAEDIC SURGERY

## 2023-12-06 PROCEDURE — 97116 GAIT TRAINING THERAPY: CPT

## 2023-12-06 PROCEDURE — 2580000003 HC RX 258: Performed by: ORTHOPAEDIC SURGERY

## 2023-12-06 PROCEDURE — 3600000005 HC SURGERY LEVEL 5 BASE: Performed by: ORTHOPAEDIC SURGERY

## 2023-12-06 PROCEDURE — 2580000003 HC RX 258: Performed by: ANESTHESIOLOGY

## 2023-12-06 DEVICE — PINNACLE POROCOAT ACETABULAR SHELL SECTOR II 52MM OD
Type: IMPLANTABLE DEVICE | Site: HIP | Status: FUNCTIONAL
Brand: PINNACLE POROCOAT

## 2023-12-06 DEVICE — BIOLOX DELTA CERAMIC FEMORAL HEAD +1.5 36MM DIA 12/14 TAPER
Type: IMPLANTABLE DEVICE | Site: HIP | Status: FUNCTIONAL
Brand: BIOLOX DELTA

## 2023-12-06 DEVICE — HIP H2 TOT ADV OTHER HD IMPL CAPPED SYNTHES: Type: IMPLANTABLE DEVICE | Site: HIP | Status: FUNCTIONAL

## 2023-12-06 DEVICE — PINNACLE HIP SOLUTIONS ALTRX POLYETHYLENE ACETABULAR LINER NEUTRAL 36MM ID 52MM OD
Type: IMPLANTABLE DEVICE | Site: HIP | Status: FUNCTIONAL
Brand: PINNACLE ALTRX

## 2023-12-06 DEVICE — ACTIS DUOFIX HIP PROSTHESIS (FEMORAL STEM 12/14 TAPER CEMENTLESS SIZE 4 HIGH COLLAR)  CE
Type: IMPLANTABLE DEVICE | Site: HIP | Status: FUNCTIONAL
Brand: ACTIS

## 2023-12-06 RX ORDER — SODIUM CHLORIDE, SODIUM LACTATE, POTASSIUM CHLORIDE, CALCIUM CHLORIDE 600; 310; 30; 20 MG/100ML; MG/100ML; MG/100ML; MG/100ML
INJECTION, SOLUTION INTRAVENOUS CONTINUOUS
Status: DISCONTINUED | OUTPATIENT
Start: 2023-12-06 | End: 2023-12-07 | Stop reason: HOSPADM

## 2023-12-06 RX ORDER — MELOXICAM 7.5 MG/1
3.75 TABLET ORAL DAILY
Status: DISCONTINUED | OUTPATIENT
Start: 2023-12-06 | End: 2023-12-07 | Stop reason: HOSPADM

## 2023-12-06 RX ORDER — SODIUM CHLORIDE 0.9 % (FLUSH) 0.9 %
5-40 SYRINGE (ML) INJECTION EVERY 12 HOURS SCHEDULED
Status: DISCONTINUED | OUTPATIENT
Start: 2023-12-06 | End: 2023-12-07 | Stop reason: HOSPADM

## 2023-12-06 RX ORDER — PROPOFOL 10 MG/ML
INJECTION, EMULSION INTRAVENOUS CONTINUOUS PRN
Status: DISCONTINUED | OUTPATIENT
Start: 2023-12-06 | End: 2023-12-06 | Stop reason: SDUPTHER

## 2023-12-06 RX ORDER — ROSUVASTATIN CALCIUM 10 MG/1
20 TABLET, COATED ORAL NIGHTLY
Status: DISCONTINUED | OUTPATIENT
Start: 2023-12-06 | End: 2023-12-07 | Stop reason: HOSPADM

## 2023-12-06 RX ORDER — LEVOTHYROXINE SODIUM 112 UG/1
112 TABLET ORAL
Status: DISCONTINUED | OUTPATIENT
Start: 2023-12-07 | End: 2023-12-07 | Stop reason: HOSPADM

## 2023-12-06 RX ORDER — GLYCOPYRROLATE 0.2 MG/ML
INJECTION INTRAMUSCULAR; INTRAVENOUS PRN
Status: DISCONTINUED | OUTPATIENT
Start: 2023-12-06 | End: 2023-12-06 | Stop reason: SDUPTHER

## 2023-12-06 RX ORDER — ACETAMINOPHEN 325 MG/1
975 TABLET ORAL ONCE
Status: COMPLETED | OUTPATIENT
Start: 2023-12-06 | End: 2023-12-06

## 2023-12-06 RX ORDER — ONDANSETRON 2 MG/ML
4 INJECTION INTRAMUSCULAR; INTRAVENOUS
Status: DISCONTINUED | OUTPATIENT
Start: 2023-12-06 | End: 2023-12-06 | Stop reason: HOSPADM

## 2023-12-06 RX ORDER — SODIUM CHLORIDE, SODIUM LACTATE, POTASSIUM CHLORIDE, CALCIUM CHLORIDE 600; 310; 30; 20 MG/100ML; MG/100ML; MG/100ML; MG/100ML
INJECTION, SOLUTION INTRAVENOUS CONTINUOUS PRN
Status: DISCONTINUED | OUTPATIENT
Start: 2023-12-06 | End: 2023-12-06 | Stop reason: SDUPTHER

## 2023-12-06 RX ORDER — HYDROXYZINE HYDROCHLORIDE 10 MG/1
10 TABLET, FILM COATED ORAL EVERY 8 HOURS PRN
Status: DISCONTINUED | OUTPATIENT
Start: 2023-12-06 | End: 2023-12-07 | Stop reason: HOSPADM

## 2023-12-06 RX ORDER — SENNOSIDES 8.6 MG
650 CAPSULE ORAL EVERY 8 HOURS
Status: DISCONTINUED | OUTPATIENT
Start: 2023-12-06 | End: 2023-12-06

## 2023-12-06 RX ORDER — ACETAMINOPHEN 325 MG/1
650 TABLET ORAL EVERY 6 HOURS
Status: DISCONTINUED | OUTPATIENT
Start: 2023-12-06 | End: 2023-12-07 | Stop reason: HOSPADM

## 2023-12-06 RX ORDER — SODIUM CHLORIDE, SODIUM LACTATE, POTASSIUM CHLORIDE, CALCIUM CHLORIDE 600; 310; 30; 20 MG/100ML; MG/100ML; MG/100ML; MG/100ML
INJECTION, SOLUTION INTRAVENOUS CONTINUOUS
Status: DISCONTINUED | OUTPATIENT
Start: 2023-12-06 | End: 2023-12-06 | Stop reason: HOSPADM

## 2023-12-06 RX ORDER — BUPIVACAINE HYDROCHLORIDE 5 MG/ML
INJECTION, SOLUTION EPIDURAL; INTRACAUDAL PRN
Status: DISCONTINUED | OUTPATIENT
Start: 2023-12-06 | End: 2023-12-06 | Stop reason: SDUPTHER

## 2023-12-06 RX ORDER — MAGNESIUM HYDROXIDE/ALUMINUM HYDROXICE/SIMETHICONE 120; 1200; 1200 MG/30ML; MG/30ML; MG/30ML
15 SUSPENSION ORAL EVERY 6 HOURS PRN
Status: DISCONTINUED | OUTPATIENT
Start: 2023-12-06 | End: 2023-12-07 | Stop reason: HOSPADM

## 2023-12-06 RX ORDER — TRANEXAMIC ACID 100 MG/ML
INJECTION, SOLUTION INTRAVENOUS PRN
Status: DISCONTINUED | OUTPATIENT
Start: 2023-12-06 | End: 2023-12-06 | Stop reason: SDUPTHER

## 2023-12-06 RX ORDER — OXYCODONE HYDROCHLORIDE 5 MG/1
10 TABLET ORAL EVERY 4 HOURS PRN
Status: DISCONTINUED | OUTPATIENT
Start: 2023-12-06 | End: 2023-12-07 | Stop reason: HOSPADM

## 2023-12-06 RX ORDER — OXYCODONE HYDROCHLORIDE 5 MG/1
5 TABLET ORAL
Status: DISCONTINUED | OUTPATIENT
Start: 2023-12-06 | End: 2023-12-06 | Stop reason: HOSPADM

## 2023-12-06 RX ORDER — FENTANYL CITRATE 50 UG/ML
INJECTION, SOLUTION INTRAMUSCULAR; INTRAVENOUS PRN
Status: DISCONTINUED | OUTPATIENT
Start: 2023-12-06 | End: 2023-12-06 | Stop reason: SDUPTHER

## 2023-12-06 RX ORDER — FENTANYL CITRATE 50 UG/ML
100 INJECTION, SOLUTION INTRAMUSCULAR; INTRAVENOUS
Status: DISCONTINUED | OUTPATIENT
Start: 2023-12-06 | End: 2023-12-06 | Stop reason: HOSPADM

## 2023-12-06 RX ORDER — MIDAZOLAM HYDROCHLORIDE 2 MG/2ML
2 INJECTION, SOLUTION INTRAMUSCULAR; INTRAVENOUS
Status: DISCONTINUED | OUTPATIENT
Start: 2023-12-06 | End: 2023-12-06 | Stop reason: HOSPADM

## 2023-12-06 RX ORDER — LIDOCAINE HYDROCHLORIDE 10 MG/ML
1 INJECTION, SOLUTION EPIDURAL; INFILTRATION; INTRACAUDAL; PERINEURAL
Status: DISCONTINUED | OUTPATIENT
Start: 2023-12-06 | End: 2023-12-06 | Stop reason: HOSPADM

## 2023-12-06 RX ORDER — SODIUM CHLORIDE 9 MG/ML
INJECTION, SOLUTION INTRAVENOUS PRN
Status: DISCONTINUED | OUTPATIENT
Start: 2023-12-06 | End: 2023-12-07 | Stop reason: HOSPADM

## 2023-12-06 RX ORDER — PHENYLEPHRINE HCL IN 0.9% NACL 0.4MG/10ML
SYRINGE (ML) INTRAVENOUS PRN
Status: DISCONTINUED | OUTPATIENT
Start: 2023-12-06 | End: 2023-12-06 | Stop reason: SDUPTHER

## 2023-12-06 RX ORDER — BUPIVACAINE HYDROCHLORIDE 2.5 MG/ML
INJECTION, SOLUTION EPIDURAL; INFILTRATION; INTRACAUDAL PRN
Status: DISCONTINUED | OUTPATIENT
Start: 2023-12-06 | End: 2023-12-06 | Stop reason: SDUPTHER

## 2023-12-06 RX ORDER — EPHEDRINE SULFATE/0.9% NACL/PF 50 MG/5 ML
SYRINGE (ML) INTRAVENOUS PRN
Status: DISCONTINUED | OUTPATIENT
Start: 2023-12-06 | End: 2023-12-06 | Stop reason: SDUPTHER

## 2023-12-06 RX ORDER — LIDOCAINE HYDROCHLORIDE 20 MG/ML
INJECTION, SOLUTION EPIDURAL; INFILTRATION; INTRACAUDAL; PERINEURAL PRN
Status: DISCONTINUED | OUTPATIENT
Start: 2023-12-06 | End: 2023-12-06 | Stop reason: SDUPTHER

## 2023-12-06 RX ORDER — SODIUM CHLORIDE 0.9 % (FLUSH) 0.9 %
5-40 SYRINGE (ML) INJECTION PRN
Status: DISCONTINUED | OUTPATIENT
Start: 2023-12-06 | End: 2023-12-07 | Stop reason: HOSPADM

## 2023-12-06 RX ORDER — POLYETHYLENE GLYCOL 3350 17 G/17G
17 POWDER, FOR SOLUTION ORAL DAILY
Status: DISCONTINUED | OUTPATIENT
Start: 2023-12-06 | End: 2023-12-07 | Stop reason: HOSPADM

## 2023-12-06 RX ORDER — OXYCODONE HYDROCHLORIDE 5 MG/1
5 TABLET ORAL EVERY 4 HOURS PRN
Status: DISCONTINUED | OUTPATIENT
Start: 2023-12-06 | End: 2023-12-07 | Stop reason: HOSPADM

## 2023-12-06 RX ORDER — FENTANYL CITRATE 50 UG/ML
25 INJECTION, SOLUTION INTRAMUSCULAR; INTRAVENOUS EVERY 5 MIN PRN
Status: DISCONTINUED | OUTPATIENT
Start: 2023-12-06 | End: 2023-12-06 | Stop reason: HOSPADM

## 2023-12-06 RX ORDER — ONDANSETRON 2 MG/ML
INJECTION INTRAMUSCULAR; INTRAVENOUS PRN
Status: DISCONTINUED | OUTPATIENT
Start: 2023-12-06 | End: 2023-12-06 | Stop reason: SDUPTHER

## 2023-12-06 RX ORDER — DEXAMETHASONE SODIUM PHOSPHATE 4 MG/ML
INJECTION, SOLUTION INTRA-ARTICULAR; INTRALESIONAL; INTRAMUSCULAR; INTRAVENOUS; SOFT TISSUE PRN
Status: DISCONTINUED | OUTPATIENT
Start: 2023-12-06 | End: 2023-12-06 | Stop reason: SDUPTHER

## 2023-12-06 RX ORDER — DIPHENHYDRAMINE HYDROCHLORIDE 50 MG/ML
12.5 INJECTION INTRAMUSCULAR; INTRAVENOUS
Status: DISCONTINUED | OUTPATIENT
Start: 2023-12-06 | End: 2023-12-06 | Stop reason: HOSPADM

## 2023-12-06 RX ORDER — FENOFIBRATE 54 MG/1
54 TABLET ORAL EVERY MORNING
Status: DISCONTINUED | OUTPATIENT
Start: 2023-12-07 | End: 2023-12-07 | Stop reason: HOSPADM

## 2023-12-06 RX ORDER — ONDANSETRON 2 MG/ML
4 INJECTION INTRAMUSCULAR; INTRAVENOUS EVERY 6 HOURS PRN
Status: DISCONTINUED | OUTPATIENT
Start: 2023-12-06 | End: 2023-12-07 | Stop reason: HOSPADM

## 2023-12-06 RX ORDER — ASPIRIN 81 MG/1
81 TABLET ORAL 2 TIMES DAILY
Status: DISCONTINUED | OUTPATIENT
Start: 2023-12-06 | End: 2023-12-07 | Stop reason: HOSPADM

## 2023-12-06 RX ORDER — PROMETHAZINE HYDROCHLORIDE 25 MG/1
12.5 TABLET ORAL EVERY 6 HOURS PRN
Status: DISCONTINUED | OUTPATIENT
Start: 2023-12-06 | End: 2023-12-07 | Stop reason: HOSPADM

## 2023-12-06 RX ORDER — KETOROLAC TROMETHAMINE 30 MG/ML
INJECTION, SOLUTION INTRAMUSCULAR; INTRAVENOUS PRN
Status: DISCONTINUED | OUTPATIENT
Start: 2023-12-06 | End: 2023-12-06 | Stop reason: SDUPTHER

## 2023-12-06 RX ORDER — CETIRIZINE HYDROCHLORIDE 10 MG/1
5 TABLET ORAL DAILY
Status: DISCONTINUED | OUTPATIENT
Start: 2023-12-06 | End: 2023-12-07 | Stop reason: HOSPADM

## 2023-12-06 RX ORDER — LISINOPRIL 20 MG/1
20 TABLET ORAL EVERY EVENING
Status: DISCONTINUED | OUTPATIENT
Start: 2023-12-06 | End: 2023-12-07 | Stop reason: HOSPADM

## 2023-12-06 RX ORDER — AMLODIPINE BESYLATE 5 MG/1
5 TABLET ORAL EVERY MORNING
Status: DISCONTINUED | OUTPATIENT
Start: 2023-12-06 | End: 2023-12-07 | Stop reason: HOSPADM

## 2023-12-06 RX ADMIN — Medication 80 MCG: at 10:12

## 2023-12-06 RX ADMIN — OXYCODONE HYDROCHLORIDE 5 MG: 5 TABLET ORAL at 20:37

## 2023-12-06 RX ADMIN — WATER 2000 MG: 1 INJECTION INTRAMUSCULAR; INTRAVENOUS; SUBCUTANEOUS at 09:09

## 2023-12-06 RX ADMIN — SODIUM CHLORIDE, POTASSIUM CHLORIDE, SODIUM LACTATE AND CALCIUM CHLORIDE: 600; 310; 30; 20 INJECTION, SOLUTION INTRAVENOUS at 07:34

## 2023-12-06 RX ADMIN — KETOROLAC TROMETHAMINE 15 MG: 30 INJECTION, SOLUTION INTRAMUSCULAR; INTRAVENOUS at 10:20

## 2023-12-06 RX ADMIN — FENTANYL CITRATE 100 MCG: 50 INJECTION, SOLUTION INTRAMUSCULAR; INTRAVENOUS at 08:34

## 2023-12-06 RX ADMIN — HYDROMORPHONE HYDROCHLORIDE 0.5 MG: 1 INJECTION, SOLUTION INTRAMUSCULAR; INTRAVENOUS; SUBCUTANEOUS at 19:02

## 2023-12-06 RX ADMIN — ROSUVASTATIN CALCIUM 20 MG: 10 TABLET, COATED ORAL at 20:37

## 2023-12-06 RX ADMIN — CEFAZOLIN 2000 MG: 2 INJECTION, POWDER, FOR SOLUTION INTRAMUSCULAR; INTRAVENOUS at 16:53

## 2023-12-06 RX ADMIN — AMLODIPINE BESYLATE 5 MG: 5 TABLET ORAL at 16:52

## 2023-12-06 RX ADMIN — SODIUM CHLORIDE, POTASSIUM CHLORIDE, SODIUM LACTATE AND CALCIUM CHLORIDE: 600; 310; 30; 20 INJECTION, SOLUTION INTRAVENOUS at 14:21

## 2023-12-06 RX ADMIN — HYDROMORPHONE HYDROCHLORIDE 0.5 MG: 1 INJECTION, SOLUTION INTRAMUSCULAR; INTRAVENOUS; SUBCUTANEOUS at 23:48

## 2023-12-06 RX ADMIN — TRANEXAMIC ACID 1000 MG: 100 INJECTION, SOLUTION INTRAVENOUS at 09:15

## 2023-12-06 RX ADMIN — PROPOFOL 150 MCG/KG/MIN: 10 INJECTION, EMULSION INTRAVENOUS at 09:08

## 2023-12-06 RX ADMIN — LISINOPRIL 20 MG: 20 TABLET ORAL at 16:52

## 2023-12-06 RX ADMIN — MELOXICAM 3.75 MG: 7.5 TABLET ORAL at 16:52

## 2023-12-06 RX ADMIN — ONDANSETRON HYDROCHLORIDE 4 MG: 2 SOLUTION INTRAMUSCULAR; INTRAVENOUS at 09:15

## 2023-12-06 RX ADMIN — GLYCOPYRROLATE 0.2 MG: 0.2 INJECTION INTRAMUSCULAR; INTRAVENOUS at 09:15

## 2023-12-06 RX ADMIN — SERTRALINE 100 MG: 50 TABLET, FILM COATED ORAL at 16:52

## 2023-12-06 RX ADMIN — ACETAMINOPHEN 650 MG: 325 TABLET ORAL at 20:37

## 2023-12-06 RX ADMIN — DEXAMETHASONE SODIUM PHOSPHATE 4 MG: 4 INJECTION, SOLUTION INTRAMUSCULAR; INTRAVENOUS at 09:15

## 2023-12-06 RX ADMIN — ACETAMINOPHEN 975 MG: 325 TABLET ORAL at 07:31

## 2023-12-06 RX ADMIN — SODIUM CHLORIDE, POTASSIUM CHLORIDE, SODIUM LACTATE AND CALCIUM CHLORIDE: 600; 310; 30; 20 INJECTION, SOLUTION INTRAVENOUS at 10:51

## 2023-12-06 RX ADMIN — SODIUM CHLORIDE, POTASSIUM CHLORIDE, SODIUM LACTATE AND CALCIUM CHLORIDE: 600; 310; 30; 20 INJECTION, SOLUTION INTRAVENOUS at 12:40

## 2023-12-06 RX ADMIN — CETIRIZINE HYDROCHLORIDE 5 MG: 10 TABLET, FILM COATED ORAL at 16:52

## 2023-12-06 RX ADMIN — BUPIVACAINE HYDROCHLORIDE 2 ML: 5 INJECTION, SOLUTION EPIDURAL; INTRACAUDAL; PERINEURAL at 08:47

## 2023-12-06 RX ADMIN — BUPIVACAINE HYDROCHLORIDE 20 ML: 2.5 INJECTION, SOLUTION EPIDURAL; INFILTRATION; INTRACAUDAL; PERINEURAL at 08:41

## 2023-12-06 RX ADMIN — ASPIRIN 81 MG: 81 TABLET, COATED ORAL at 20:37

## 2023-12-06 RX ADMIN — Medication 15 MG: at 10:09

## 2023-12-06 RX ADMIN — LIDOCAINE HYDROCHLORIDE 30 MG: 20 INJECTION, SOLUTION EPIDURAL; INFILTRATION; INTRACAUDAL; PERINEURAL at 09:08

## 2023-12-06 RX ADMIN — ASPIRIN 81 MG: 81 TABLET, COATED ORAL at 16:52

## 2023-12-06 RX ADMIN — SODIUM CHLORIDE, PRESERVATIVE FREE 10 ML: 5 INJECTION INTRAVENOUS at 20:38

## 2023-12-06 ASSESSMENT — PAIN DESCRIPTION - ORIENTATION
ORIENTATION: RIGHT

## 2023-12-06 ASSESSMENT — PAIN DESCRIPTION - LOCATION
LOCATION: HIP

## 2023-12-06 ASSESSMENT — PAIN SCALES - GENERAL
PAINLEVEL_OUTOF10: 7
PAINLEVEL_OUTOF10: 7
PAINLEVEL_OUTOF10: 5

## 2023-12-06 ASSESSMENT — PAIN - FUNCTIONAL ASSESSMENT: PAIN_FUNCTIONAL_ASSESSMENT: 0-10

## 2023-12-06 ASSESSMENT — PAIN DESCRIPTION - DESCRIPTORS
DESCRIPTORS: STABBING
DESCRIPTORS: THROBBING;ACHING
DESCRIPTORS: THROBBING
DESCRIPTORS: ACHING

## 2023-12-06 NOTE — PROGRESS NOTES
Pharmacy Note - Renal dose adjustment made per P/T protocol    Original order:  Fenofibrate 160 mg daily    Estimated Creatinine Clearance: 29 mL/min (A) (based on SCr of 1.38 mg/dL (H)). No results for input(s): \"BUN\", \"CREATININE\" in the last 72 hours. Renally adjusted order:  Fenofibrate 54 mg daily    Please call pharmacy with any questions.     Thank you,  Salem Aase, [facility] MS  12/6/2023 3:13 PM

## 2023-12-06 NOTE — OP NOTE
completed in PACU    POSTOPERATIVE PLAN: The patient will begin same day postoperative physical therapy with mobilization. Postoperative pain control will be with PO and IV medications. DVT prophylaxis will be stratified. After the patient has mobilized appropriately and is deemed appropriate for discharge from PT, they will be discharged home. FOLLOW UP: We will plan to see the patient back in clinic approximately 2 weeks after surgery for a wound check and follow up on clinical progress.

## 2023-12-06 NOTE — PERIOP NOTE
TRANSFER - OUT REPORT:    Verbal report given to CATRINA Mckay on Rossana Ibarra  being transferred to 4th floor for routine post-op       Report consisted of patient's Situation, Background, Assessment and   Recommendations(SBAR). Information from the following report(s) Nurse Handoff Report, Adult Overview, Surgery Report, Intake/Output, and MAR was reviewed with the receiving nurse. Lines:   Peripheral IV 12/06/23 Left;Posterior Hand (Active)   Site Assessment Clean, dry & intact 12/06/23 1157   Line Status Infusing 12/06/23 [address] Connections checked and tightened 12/06/23 1157   Phlebitis Assessment No symptoms 12/06/23 1157   Infiltration Assessment 0 12/06/23 1157   Alcohol Cap Used Yes 12/06/23 1157   Dressing Status Clean, dry & intact 12/06/23 1157   Dressing Type Transparent 12/06/23 1157   Dressing Intervention New 12/06/23 0730        Opportunity for questions and clarification was provided.       Patient transported with:  Registered Nurse

## 2023-12-06 NOTE — ANESTHESIA POSTPROCEDURE EVALUATION
Department of Anesthesiology  Postprocedure Note    Patient: Olivier Cuevas  MRN: [id number]  YOB: 1940  Date of evaluation: 12/6/2023      Procedure Summary       Date: 12/06/23 Room / Location: [facility] MAIN OR F3 / M MAIN OR    Anesthesia Start: 0900 Anesthesia Stop: 0390    Procedure: RIGHT ANTERIOR TOTAL HIP REPLACEMENT (Right: Hip) Diagnosis:       Primary osteoarthritis of right hip      (Primary osteoarthritis of right hip [M16.11])    Surgeons: Lisa Garner MD Responsible Provider: Maira Penny MD    Anesthesia Type: MAC ASA Status: 2            Anesthesia Type: MAC    Claire Phase I: Claire Score: 8    Claire Phase II:        Anesthesia Post Evaluation    Patient location during evaluation: PACU  Patient participation: complete - patient participated  Level of consciousness: awake  Pain score: 0  Airway patency: patent  Nausea & Vomiting: no nausea and no vomiting  Complications: no  Cardiovascular status: blood pressure returned to baseline  Respiratory status: acceptable  Hydration status: euvolemic  Multimodal analgesia pain management approach  Pain management: adequate

## 2023-12-06 NOTE — ANESTHESIA PROCEDURE NOTES
Spinal Block    Patient location during procedure: pre-op  End time: 12/6/2023 8:47 AM  Reason for block: post-op pain management, primary anesthetic and at surgeon's request  Staffing  Performed: resident/CRNA   Resident/CRNA: Jolee Angelucci, APRN - CRNA  Performed by: Jolee Angelucci, APRN - CRNA  Authorized by: Chang Kathleen MD    Spinal Block  Patient position: sitting  Prep: DuraPrep  Patient monitoring: cardiac monitor, continuous pulse ox, continuous capnometry, frequent blood pressure checks and oxygen  Approach: midline  Location: L3/L4  Provider prep: mask and sterile gloves  Needle  Needle type: Pencan   Needle gauge: 25 G  Assessment  Swirl obtained: Yes  CSF: clear  Attempts: 2  Hemodynamics: stable  Preanesthetic Checklist  Completed: patient identified, IV checked, site marked, risks and benefits discussed, surgical/procedural consents, pre-op evaluation, timeout performed, anesthesia consent given, oxygen available and monitors applied/VS acknowledged

## 2023-12-06 NOTE — DISCHARGE INSTRUCTIONS
Discharge Instructions after Total Hip Replacement  Bridger Moreno MD  Office phone number: [phone]; extension to leave voicemail [phone] or [phone]  After hours (weekdays after 5PM or on weekends): Please call [phone], and follow the prompts to reach the on call provider      Activity:  You may put full weight on your operated leg unless otherwise instructed  Walk with a walker or two crutches for 2 weeks after surgery, then plan to go to a single point cane or single crutch for 2 weeks after that. The reason to walk with an assistive device is to avoid losing your balance and falling  Elevate your operated extremity (\"toes above nose\") throughout the day to decrease swelling and pain  Ice your operated hip multiple (3-4) times a day to decrease swelling and pain. Use a bag of ice or frozen vegetables inside a towel, placed onto the skin. This should be done for about 20-30 minutes at a time, with at least 20-30 minutes before the next icing session  Unless otherwise indicated, we will plan on starting physical therapy at your 2 week postoperative visit. You may have been given a prescription for PT before the surgery or at the time of discharge from the hospital  Remember your hip precautions reviewed with you by your physical therapist while in the hospital. In general, avoid extremes of range of motion for the first couple of months after srugery. Be sure to avoid those activities that are recommended against to avoid hip dislocation. Physical Therapy: We may have you undergo physical therapy after your operation. If so, you should have been provided a prescription for PT that details the goals that I have for the physical therapist after surgery. If you do PT, you should be attending PT 2x per week for 4 weeks after surgery, starting a couple of weeks after the date of surgery. Be sure to be performing home exercises to consolidate the gains you are making in PT.  The best exercise after total

## 2023-12-06 NOTE — H&P
Orthopaedic PRE-OP Admission History and Physical        Subjective:   Patient is a 82 y.o.  female who presented for right hip pain. The patient was evaluated and determined the most appropriate plan of care is to proceed with surgical intervention. Conservative measures were not indicated or successful. 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  Authorizing Provider   TURMERIC-GINGER PO Take by mouth in the morning and at bedtime    ProviderEdie MD   Multiple Vitamin (MULTIVITAMIN ADULT PO) Take by mouth every morning    Edie Kimball MD   Multiple Vitamins-Minerals (HAIR SKIN AND NAILS FORMULA PO) Take by mouth every evening    Edie Kimball MD   ibuprofen (ADVIL;MOTRIN) 200 MG tablet Take 2 tablets by mouth as needed for Pain    Edie Kimball MD   lisinopril (PRINIVIL;ZESTRIL) 20 MG tablet Take 1 tablet by mouth every evening    Edie Kimball MD   acetaminophen (TYLENOL) 650 MG extended release tablet Take 650 mg by mouth in the

## 2023-12-06 NOTE — PROGRESS NOTES
Pharmacy Note - Renal dose adjustment made per P/T protocol    Original order:  Cetirizine 10 mg daily    Estimated Creatinine Clearance: 29 mL/min (A) (based on SCr of 1.38 mg/dL (H)). No results for input(s): \"BUN\", \"CREATININE\" in the last 72 hours. Renally adjusted order:  Cetirizine 5 mg daily    Please call pharmacy with any questions.     Thank you,  Ricky Saldaña, [facility] MS  12/6/2023 3:11 PM

## 2023-12-06 NOTE — BRIEF OP NOTE
Brief Postoperative Note      Patient: Nicky Landa  YOB: 1940  MRN: [id number]    Date of Procedure: 12/6/2023    Pre-Op Diagnosis Codes:     * Primary osteoarthritis of right hip [M16.11]    Post-Op Diagnosis: Same       Procedure(s):  RIGHT ANTERIOR TOTAL HIP REPLACEMENT    Surgeon(s):  Makeda Pavon MD    Assistant:  Surgical Assistant: (Unknown)    Anesthesia: Spinal    Estimated Blood Loss (mL): 920     Complications: None    Specimens:   * No specimens in log *    Implants:  * No surgical log found *      Drains: * No LDAs found *    Findings: advanced OA right hip      Electronically signed by Makeda Pavon MD on 12/6/2023 at 6:21 AM

## 2023-12-06 NOTE — ANESTHESIA PROCEDURE NOTES
Peripheral Block    Patient location during procedure: pre-op  Reason for block: procedure for pain, post-op pain management, primary anesthetic and at surgeon's request  Start time: 12/6/2023 8:34 AM  End time: 12/6/2023 8:41 AM  Staffing  Performed: anesthesiologist   Anesthesiologist: Kendall Hanson MD  Performed by: Kendall Hanson MD  Authorized by: Kendall Hanson MD    Preanesthetic Checklist  Completed: patient identified, IV checked, site marked, risks and benefits discussed, surgical/procedural consents, pre-op evaluation, timeout performed, anesthesia consent given, oxygen available and monitors applied/VS acknowledged  Peripheral Block   Patient position: supine  Prep: ChloraPrep  Provider prep: mask and sterile gloves  Patient monitoring: cardiac monitor, continuous pulse ox, continuous capnometry, frequent blood pressure checks, IV access, oxygen and responsive to questions  Block type: PENG  Laterality: right  Injection technique: single-shot  Guidance: ultrasound guided    Needle   Needle type: Other   Needle gauge: 21 G  Needle localization: ultrasound guidance  Needle length: 10 cmOther needle type: STIMUPLEX  Assessment   Injection assessment: negative aspiration for heme, no paresthesia on injection, local visualized surrounding nerve on ultrasound and no intravascular symptoms  Hemodynamics: stable  Outcomes: patient tolerated procedure well    Additional Notes  Korin FRITZ witnessed timeout and block written on correct side.

## 2023-12-07 VITALS
HEART RATE: 67 BPM | RESPIRATION RATE: 16 BRPM | BODY MASS INDEX: 29.51 KG/M2 | OXYGEN SATURATION: 96 % | TEMPERATURE: 99.5 F | DIASTOLIC BLOOD PRESSURE: 53 MMHG | WEIGHT: 158.73 LBS | SYSTOLIC BLOOD PRESSURE: 100 MMHG

## 2023-12-07 LAB
ANION GAP SERPL CALC-SCNC: 4 MMOL/L (ref 5–15)
BUN SERPL-MCNC: 27 MG/DL (ref 6–20)
BUN/CREAT SERPL: 20 (ref 12–20)
CALCIUM SERPL-MCNC: 8.3 MG/DL (ref 8.5–10.1)
CHLORIDE SERPL-SCNC: 105 MMOL/L (ref 97–108)
CO2 SERPL-SCNC: 26 MMOL/L (ref 21–32)
CREAT SERPL-MCNC: 1.37 MG/DL (ref 0.55–1.02)
ERYTHROCYTE [DISTWIDTH] IN BLOOD BY AUTOMATED COUNT: 13.7 % (ref 11.5–14.5)
GLUCOSE SERPL-MCNC: 96 MG/DL (ref 65–100)
HCT VFR BLD AUTO: 26.8 % (ref 35–47)
HGB BLD-MCNC: 8.5 G/DL (ref 11.5–16)
MCH RBC QN AUTO: 27.5 PG (ref 26–34)
MCHC RBC AUTO-ENTMCNC: 31.7 G/DL (ref 30–36.5)
MCV RBC AUTO: 86.7 FL (ref 80–99)
NRBC # BLD: 0 K/UL (ref 0–0.01)
NRBC BLD-RTO: 0 PER 100 WBC
PLATELET # BLD AUTO: 194 K/UL (ref 150–400)
PMV BLD AUTO: 10.8 FL (ref 8.9–12.9)
POTASSIUM SERPL-SCNC: 4.6 MMOL/L (ref 3.5–5.1)
RBC # BLD AUTO: 3.09 M/UL (ref 3.8–5.2)
SODIUM SERPL-SCNC: 135 MMOL/L (ref 136–145)
WBC # BLD AUTO: 9.6 K/UL (ref 3.6–11)

## 2023-12-07 PROCEDURE — 97116 GAIT TRAINING THERAPY: CPT

## 2023-12-07 PROCEDURE — 80048 BASIC METABOLIC PNL TOTAL CA: CPT

## 2023-12-07 PROCEDURE — 6360000002 HC RX W HCPCS: Performed by: STUDENT IN AN ORGANIZED HEALTH CARE EDUCATION/TRAINING PROGRAM

## 2023-12-07 PROCEDURE — 97530 THERAPEUTIC ACTIVITIES: CPT

## 2023-12-07 PROCEDURE — 36415 COLL VENOUS BLD VENIPUNCTURE: CPT

## 2023-12-07 PROCEDURE — 6370000000 HC RX 637 (ALT 250 FOR IP): Performed by: STUDENT IN AN ORGANIZED HEALTH CARE EDUCATION/TRAINING PROGRAM

## 2023-12-07 PROCEDURE — 97535 SELF CARE MNGMENT TRAINING: CPT

## 2023-12-07 PROCEDURE — 97165 OT EVAL LOW COMPLEX 30 MIN: CPT

## 2023-12-07 PROCEDURE — 2580000003 HC RX 258: Performed by: STUDENT IN AN ORGANIZED HEALTH CARE EDUCATION/TRAINING PROGRAM

## 2023-12-07 PROCEDURE — 6370000000 HC RX 637 (ALT 250 FOR IP): Performed by: ORTHOPAEDIC SURGERY

## 2023-12-07 PROCEDURE — 85027 COMPLETE CBC AUTOMATED: CPT

## 2023-12-07 RX ORDER — LANOLIN ALCOHOL/MO/W.PET/CERES
5 CREAM (GRAM) TOPICAL NIGHTLY PRN
Status: DISCONTINUED | OUTPATIENT
Start: 2023-12-07 | End: 2023-12-07 | Stop reason: HOSPADM

## 2023-12-07 RX ORDER — ACETAMINOPHEN 500 MG
500 TABLET ORAL 3 TIMES DAILY
Qty: 30 TABLET | Refills: 0 | Status: SHIPPED | OUTPATIENT
Start: 2023-12-07 | End: 2023-12-17

## 2023-12-07 RX ORDER — ASPIRIN 81 MG/1
81 TABLET, CHEWABLE ORAL 2 TIMES DAILY
Qty: 60 TABLET | Refills: 0 | Status: SHIPPED | OUTPATIENT
Start: 2023-12-07 | End: 2024-01-06

## 2023-12-07 RX ORDER — POLYETHYLENE GLYCOL 3350 17 G/17G
17 POWDER, FOR SOLUTION ORAL DAILY
Qty: 7 EACH | Refills: 0 | Status: SHIPPED | OUTPATIENT
Start: 2023-12-07 | End: 2023-12-14

## 2023-12-07 RX ORDER — OXYCODONE HYDROCHLORIDE 5 MG/1
5 TABLET ORAL EVERY 4 HOURS PRN
Qty: 42 TABLET | Refills: 0 | Status: SHIPPED | OUTPATIENT
Start: 2023-12-07 | End: 2023-12-14

## 2023-12-07 RX ADMIN — CEFAZOLIN 2000 MG: 2 INJECTION, POWDER, FOR SOLUTION INTRAMUSCULAR; INTRAVENOUS at 01:27

## 2023-12-07 RX ADMIN — FENOFIBRATE 54 MG: 54 TABLET, FILM COATED ORAL at 08:07

## 2023-12-07 RX ADMIN — ACETAMINOPHEN 650 MG: 325 TABLET ORAL at 07:15

## 2023-12-07 RX ADMIN — OXYCODONE HYDROCHLORIDE 10 MG: 5 TABLET ORAL at 08:07

## 2023-12-07 RX ADMIN — ASPIRIN 81 MG: 81 TABLET, COATED ORAL at 08:07

## 2023-12-07 RX ADMIN — CETIRIZINE HYDROCHLORIDE 5 MG: 10 TABLET, FILM COATED ORAL at 08:07

## 2023-12-07 RX ADMIN — MELOXICAM 3.75 MG: 7.5 TABLET ORAL at 08:07

## 2023-12-07 RX ADMIN — SODIUM CHLORIDE, PRESERVATIVE FREE 10 ML: 5 INJECTION INTRAVENOUS at 08:09

## 2023-12-07 RX ADMIN — SODIUM CHLORIDE, POTASSIUM CHLORIDE, SODIUM LACTATE AND CALCIUM CHLORIDE: 600; 310; 30; 20 INJECTION, SOLUTION INTRAVENOUS at 08:10

## 2023-12-07 RX ADMIN — Medication 4.5 MG: at 01:26

## 2023-12-07 RX ADMIN — ACETAMINOPHEN 650 MG: 325 TABLET ORAL at 01:26

## 2023-12-07 RX ADMIN — LEVOTHYROXINE SODIUM 112 MCG: 0.11 TABLET ORAL at 07:15

## 2023-12-07 RX ADMIN — POLYETHYLENE GLYCOL 3350 17 G: 17 POWDER, FOR SOLUTION ORAL at 08:08

## 2023-12-07 RX ADMIN — ACETAMINOPHEN 650 MG: 325 TABLET ORAL at 12:14

## 2023-12-07 RX ADMIN — HYDROMORPHONE HYDROCHLORIDE 0.25 MG: 1 INJECTION, SOLUTION INTRAMUSCULAR; INTRAVENOUS; SUBCUTANEOUS at 09:47

## 2023-12-07 ASSESSMENT — PAIN SCALES - GENERAL
PAINLEVEL_OUTOF10: 5
PAINLEVEL_OUTOF10: 6

## 2023-12-07 ASSESSMENT — PAIN DESCRIPTION - ORIENTATION
ORIENTATION: RIGHT
ORIENTATION: RIGHT

## 2023-12-07 ASSESSMENT — PAIN DESCRIPTION - LOCATION
LOCATION: HIP
LOCATION: HIP

## 2023-12-07 ASSESSMENT — PAIN DESCRIPTION - DESCRIPTORS
DESCRIPTORS: ACHING
DESCRIPTORS: ACHING

## 2023-12-07 NOTE — PROGRESS NOTES
Patient discussed with Dr. Soniya Preciado, who evaluated patient this morning. Planning for discharge home today pending PT clearance. Will continue to follow. Malcolm Sanchez, SANJANA - NP      0979-Patient cleared by therapy. Ok to proceed with discharge home.

## 2023-12-07 NOTE — CARE COORDINATION
Initial Case Management Assessment       12/07/23 1205   Service Assessment   Patient Orientation Alert and Oriented   Cognition Alert   History Provided By Patient   Primary Caregiver Self   Support Systems Children;Family Members   Patient's Healthcare Decision Maker is: Named in [address]   PCP Verified by CM Yes  (Dr. Amish Peralta)   Last Visit to PCP Within last 6 months   Prior Functional Level Independent in ADLs/IADLs   Current Functional Level Independent in ADLs/IADLs   Can patient return to prior living arrangement Yes   Ability to make needs known: Good   Family able to assist with home care needs: Yes   Financial Resources [employer] None   Social/Functional History   Lives With Spouse   Type of [address] [place] [address]  One level   [address] to enter with rails   Entrance Stairs - Number of Steps [address] Cane;[place], [place] Help From Family;Friend(s)   ADL Assistance Independent   Homemaking Assistance Independent   Ambulation Assistance Independent   Transfer Assistance Independent   Active  Yes   Occupation Retired   Discharge Planning   Type of [address] Prior To Admission None   Potential Assistance Needed N/A   DME Ordered? No   Potential Assistance Purchasing Medications No   Type of Home Care Services None   Patient expects to be discharged to: House   One/Two Story Residence One story   Services At/After Discharge   Transition of Care Consult (CM Consult) [facility] Discharge None   [employer] Information Provided? No   Mode of Transport at Discharge Other (see comment)  (friend to provide)       Patient was admitted on 12/6/23 for total right hip arthroplasty. This CM met with patient at bedside, introduced self, explained role, and confirmed demographic information on face sheet. Patient lives at home alone. Pt is independent with all ADL's and drives.  Pt has a

## 2023-12-07 NOTE — PROGRESS NOTES
Spiritual Care Partner Volunteer visited patient at [address] [place] [address] in [facility] on 12/7/2023   Documented by:    Dov Plata M.Div., [facility].   [facility] [facility] Team [phone]

## 2023-12-07 NOTE — PROGRESS NOTES
TOTAL HIP ARTHROPLASTY DAILY NOTE    POD  1 Day Post-Op s/p Procedure(s):  RIGHT ANTERIOR TOTAL HIP REPLACEMENT     ASSESSMENT / PLAN :   Pain Control : Excellent - Able to sleep and participate with therapy  Wound or incisional issue : Healing incision with no visible drainage  Therapy / Weight Bearing Recommendations : Weight bear as tolerated with use of a walker and two person assist while mobilizing  DVT Prophylaxis :  Aspirin and mechanical lower extremity compression device  Disposition : home today         SUBJECTIVE :     MALVIN overnight. Pain controlled. Working with PT. Voiding without difficulty. Tolerating a regular diet. Denies CP/SOB/Abd pain/or other complaints. OBJECTIVE :       Alert and oriented x3. Right exam of the hip reveals that the dressing is clean, dry and intact. The patient has + quadriceps, ankle DF/PF, EHL/FHL  Sensation is intact to light touch distally  No calf pain. Labs:  Recent Labs     12/07/23  0326   HGB 8.5*   HCT 26.8*   *   K 4.6      CO2 26   BUN 27*       Intake/Output Summary (Last 24 hours) at 12/7/2023 7014  Last data filed at 12/6/2023 2038  Gross per 24 hour   Intake 10 ml   Output --   Net 10 ml      Patient mobility                 Alejandra Mathews [place], [place]  Supervising Physician: Dr. Dunne Class Staff: Elif Trivedi/[facility]  Office : [phone]

## 2023-12-07 NOTE — PLAN OF CARE
Problem: Pain  Goal: Verbalizes/displays adequate comfort level or baseline comfort level  12/7/2023 0100 by Bradly Alvarado RN  Outcome: Progressing     Problem: Safety - Adult  Goal: Free from fall injury  12/7/2023 0100 by Bradly Alvarado RN  Outcome: Progressing
Problem: Pain  Goal: Verbalizes/displays adequate comfort level or baseline comfort level  12/7/2023 0756 by Katherine Kennedy RN  Outcome: [facility] [address] Progressing  12/7/2023 0100 by Kyler Garcia RN  Outcome: Progressing     Problem: Safety - Adult  Goal: Free from fall injury  12/7/2023 0756 by Katherine Kennedy RN  Outcome: [facility] [address] Progressing  12/7/2023 0100 by Kyler Garcia RN  Outcome: Progressing     Problem: Discharge Planning  Goal: Discharge to home or other facility with appropriate resources  Outcome: [facility] [address] Progressing     Problem: ABCDS Injury Assessment  Goal: Absence of physical injury  12/7/2023 0756 by Katherine Kennedy RN  Outcome: [facility] 114 Progressing  12/7/2023 0100 by Kyler Garcia RN  Outcome: Progressing
Problem: Pain  Goal: Verbalizes/displays adequate comfort level or baseline comfort level  Outcome: HH/HSPC Progressing     Problem: Safety - Adult  Goal: Free from fall injury  Outcome: [facility] 114 Progressing     Problem: Discharge Planning  Goal: Discharge to home or other facility with appropriate resources  Outcome: [facility] 114 Progressing     Problem: ABCDS Injury Assessment  Goal: Absence of physical injury  Outcome: [facility] 114 Progressing
Problem: Physical Therapy - Adult  Goal: By Discharge: Performs mobility at highest level of function for planned discharge setting. See evaluation for individualized goals. Description: FUNCTIONAL STATUS PRIOR TO ADMISSION: Patient was independent and active without use of DME.    HOME SUPPORT PRIOR TO ADMISSION: The patient lived alone with a local daughter and friends to provide assistance. Physical Therapy Goals  Initiated 12/6/2023  1. Patient will move from supine to sit and sit to supine and roll side to side in bed with modified independence within 4 day(s). 2.  Patient will perform sit to stand with modified independence within 4 day(s). 3.  Patient will transfer from bed to chair and chair to bed with modified independence using the least restrictive device within 4 day(s). 4.  Patient will ambulate with modified independence for 150 feet with the least restrictive device within 4 day(s). 5.  Patient will ascend/descend 5 stairs with 1 handrail(s) with modified independence within 4 day(s). 6.  Patient will perform anterior DONELL home exercise program per protocol with independence within 4 days. Outcome: Progressing   PHYSICAL THERAPY EVALUATION    Patient: Holly Montoya (50 y.o. female)  Date: 12/6/2023  Primary Diagnosis: Primary osteoarthritis of right hip [M16.11]  Status post total replacement of hip, unspecified laterality [Z96.649]  Procedure(s) (LRB):  RIGHT ANTERIOR TOTAL HIP REPLACEMENT (Right) Day of Surgery   Precautions:                      ASSESSMENT :   DEFICITS/IMPAIRMENTS:   The patient is limited by decreased ROM, strength, activity tolerance, balance, increased pain levels     Based on the impairments listed above, the patient presents with anticipated impairments following admission for a right anterior DONELL. Her pain and BP were well controlled. She required CGA assist overall for bed mobility and transfers. Gait training completed x40' using a RW requiring CGA.
[]                                        []                                        []                                           Heel Slides   [x]                                        []                                        []                                        []                                           Hip Abduction   [x]                                        []                                        []                                        []                                           Glut Sets   [x]                                        []                                        []                                        []                                              []                                        []                                        []                                        []                                              []                                        []                                        []                                        []                                             STANDING  EXERCISES   Sets   Reps   Active Active Assist   Passive Self ROM   Comments   Heel Raises   []                                        []                                        []                                        []                                           Hip Abduction   []                                        []                                        []                                        []                                              []                                        []                                        []                                        []                                              []                                        []                                        []                                        []

## 2025-02-21 ENCOUNTER — HOSPITAL ENCOUNTER (EMERGENCY)
Facility: HOSPITAL | Age: 85
Discharge: HOME | End: 2025-02-21
Attending: EMERGENCY MEDICINE
Payer: MEDICARE

## 2025-02-21 VITALS
OXYGEN SATURATION: 98 % | HEART RATE: 76 BPM | RESPIRATION RATE: 16 BRPM | WEIGHT: 154.32 LBS | SYSTOLIC BLOOD PRESSURE: 179 MMHG | BODY MASS INDEX: 28.4 KG/M2 | HEIGHT: 62 IN | DIASTOLIC BLOOD PRESSURE: 69 MMHG | TEMPERATURE: 99.2 F

## 2025-02-21 DIAGNOSIS — R30.0 DYSURIA: Primary | ICD-10-CM

## 2025-02-21 LAB
APPEARANCE UR: CLEAR
BACTERIA URNS QL MICRO: NEGATIVE /HPF
BILIRUB UR QL: NEGATIVE
COLOR UR: ABNORMAL
EPITH CASTS URNS QL MICRO: ABNORMAL /LPF
GLUCOSE UR STRIP.AUTO-MCNC: >1000 MG/DL
HGB UR QL STRIP: ABNORMAL
KETONES UR QL STRIP.AUTO: NEGATIVE MG/DL
LEUKOCYTE ESTERASE UR QL STRIP.AUTO: NEGATIVE
NITRITE UR QL STRIP.AUTO: NEGATIVE
PH UR STRIP: 6 (ref 5–8)
PROT UR STRIP-MCNC: NEGATIVE MG/DL
RBC #/AREA URNS HPF: ABNORMAL /HPF (ref 0–5)
SP GR UR REFRACTOMETRY: 1.01 (ref 1–1.03)
URINE CULTURE IF INDICATED: ABNORMAL
UROBILINOGEN UR QL STRIP.AUTO: 0.2 EU/DL (ref 0.2–1)
WBC URNS QL MICRO: ABNORMAL /HPF (ref 0–4)

## 2025-02-21 PROCEDURE — 99283 EMERGENCY DEPT VISIT LOW MDM: CPT

## 2025-02-21 PROCEDURE — 6370000000 HC RX 637 (ALT 250 FOR IP): Performed by: EMERGENCY MEDICINE

## 2025-02-21 PROCEDURE — 81001 URINALYSIS AUTO W/SCOPE: CPT

## 2025-02-21 PROCEDURE — 87086 URINE CULTURE/COLONY COUNT: CPT

## 2025-02-21 RX ORDER — DAPAGLIFLOZIN 5 MG/1
5 TABLET, FILM COATED ORAL EVERY MORNING
COMMUNITY

## 2025-02-21 RX ORDER — CEPHALEXIN 500 MG/1
500 CAPSULE ORAL 2 TIMES DAILY
Qty: 14 CAPSULE | Refills: 0 | Status: SHIPPED | OUTPATIENT
Start: 2025-02-21 | End: 2025-02-28

## 2025-02-21 RX ORDER — ONDANSETRON 4 MG/1
4 TABLET, FILM COATED ORAL EVERY 8 HOURS PRN
COMMUNITY

## 2025-02-21 RX ADMIN — CEPHALEXIN 500 MG: 250 CAPSULE ORAL at 10:26

## 2025-02-21 ASSESSMENT — PAIN - FUNCTIONAL ASSESSMENT: PAIN_FUNCTIONAL_ASSESSMENT: 0-10

## 2025-02-21 ASSESSMENT — PAIN DESCRIPTION - LOCATION: LOCATION: PELVIS

## 2025-02-21 ASSESSMENT — PAIN SCALES - GENERAL: PAINLEVEL_OUTOF10: 1

## 2025-02-21 NOTE — ED PROVIDER NOTES
[place] EMERGENCY DEPARTMENT  EMERGENCY DEPARTMENT ENCOUNTER      Pt Name: Mary Claros  MRN: [id number]  Birthdate 1940  Date of evaluation: 2/21/2025  Provider: Jessica Gomez DO    CHIEF COMPLAINT       Chief Complaint   Patient presents with    Dysuria         HISTORY OF PRESENT ILLNESS   (Location/Symptom, Timing/Onset, Context/Setting, Quality, Duration, Modifying Factors, Severity)  Note limiting factors.   HPI      Review of External Medical Records:     Nursing Notes were reviewed.    REVIEW OF SYSTEMS    (2-9 systems for level 4, 10 or more for level 5)     Review of Systems    Except as noted above the remainder of the review of systems was reviewed and negative.       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    Hypertension     Hypothyroid     PONV (postoperative nausea and vomiting)     Seasonal allergies     White coat syndrome with hypertension          SURGICAL HISTORY       Past Surgical History:   Procedure Laterality Date    ANKLE FRACTURE SURGERY Right 1993    BLEPHAROPLASTY Bilateral     upper    COLONOSCOPY      HYSTERECTOMY (CERVIX STATUS UNKNOWN)      LUMBAR FUSION  07/2022    TOTAL HIP ARTHROPLASTY Left 02/2023    TOTAL HIP ARTHROPLASTY Right 12/6/2023    RIGHT ANTERIOR TOTAL HIP REPLACEMENT performed by Ron Sanchez MD at [facility] MAIN OR    WISDOM TOOTH EXTRACTION           CURRENT MEDICATIONS       Current Discharge Medication List        CONTINUE these medications which have NOT CHANGED    Details   dapagliflozin (FARXIGA) 5 MG tablet Take 1 tablet by mouth every morning      ondansetron (ZOFRAN) 4 MG tablet Take 1 tablet by mouth every 8 hours as needed for Nausea or Vomiting      Multiple Vitamin (MULTIVITAMIN

## 2025-02-21 NOTE — ED TRIAGE NOTES
Pt ambulates to bed 15 with steady gait with c/o one week of dysuria with some nausea. No fever or vomiting.

## 2025-02-22 LAB
BACTERIA SPEC CULT: NORMAL
CC UR VC: NORMAL
SERVICE CMNT-IMP: NORMAL

## 2025-04-24 ENCOUNTER — TRANSCRIBE ORDERS (OUTPATIENT)
Facility: HOSPITAL | Age: 85
End: 2025-04-24

## 2025-04-24 DIAGNOSIS — N18.31 CHRONIC KIDNEY DISEASE, STAGE 3A (HCC): Primary | ICD-10-CM

## 2025-05-07 ENCOUNTER — HOSPITAL ENCOUNTER (OUTPATIENT)
Facility: HOSPITAL | Age: 85
Discharge: HOME | End: 2025-05-10
Attending: INTERNAL MEDICINE
Payer: MEDICARE

## 2025-05-07 DIAGNOSIS — N18.31 CHRONIC KIDNEY DISEASE, STAGE 3A (HCC): ICD-10-CM

## 2025-05-07 PROCEDURE — 76770 US EXAM ABDO BACK WALL COMP: CPT

## 2025-08-27 ENCOUNTER — TRANSCRIBE ORDERS (OUTPATIENT)
Facility: HOSPITAL | Age: 85
End: 2025-08-27

## 2025-08-27 DIAGNOSIS — Z12.31 ENCOUNTER FOR SCREENING MAMMOGRAM FOR MALIGNANT NEOPLASM OF BREAST: ICD-10-CM

## 2025-08-27 DIAGNOSIS — Z78.0 ASYMPTOMATIC MENOPAUSAL STATE: Primary | ICD-10-CM

## (undated) DEVICE — 6619 2 PTNT ISO SYS INCISE AREA&LT;(&GT;&&LT;)&GT;P: Brand: STERI-DRAPE™ IOBAN™ 2

## (undated) DEVICE — 1010 S-DRAPE TOWEL DRAPE 10/BX: Brand: STERI-DRAPE™

## (undated) DEVICE — YANKAUER,OPEN TIP,W/O VENT,STERILE: Brand: MEDLINE INDUSTRIES, INC.

## (undated) DEVICE — GLOVE ORTHO 8   MSG9480

## (undated) DEVICE — SHEET, DRAPE, SPLIT, STERILE: Brand: MEDLINE

## (undated) DEVICE — TOTAL JOINT-SFMC: Brand: MEDLINE INDUSTRIES, INC.

## (undated) DEVICE — DRAPE,REIN 53X77,STERILE: Brand: MEDLINE

## (undated) DEVICE — GLOVE ORANGE PI 7 1/2   MSG9075

## (undated) DEVICE — SOLUTION IRRIG 3000ML 0.9% SOD CHL USP UROMATIC PLAS CONT

## (undated) DEVICE — GLOVE SURG SZ 8 L12IN FNGR THK79MIL GRN LTX FREE

## (undated) DEVICE — SUTURE VCRL SZ 2-0 L36IN ABSRB UD L36MM CT-1 1/2 CIR J945H

## (undated) DEVICE — ALCOHOL RUBBING ISO 16OZ 70%

## (undated) DEVICE — TAPE,CLOTH/SILK,CURAD,3"X10YD,LF,40/CS: Brand: CURAD

## (undated) DEVICE — LAMINECTOMY-SFMC: Brand: MEDLINE INDUSTRIES, INC.

## (undated) DEVICE — 3M™ STERI-DRAPE™ U-DRAPE 1015: Brand: STERI-DRAPE™

## (undated) DEVICE — DISPOSABLE ORTHOPAEDIC PROTECTOR: Brand: ALEXIS ® ORTHOPAEDIC PROTECTOR

## (undated) DEVICE — TOTAL TRAY, 16FR 10ML SIL FOLEY, URN: Brand: MEDLINE

## (undated) DEVICE — ELECTRODE BLDE L4IN NONINSULATED EDGE

## (undated) DEVICE — KIT POS FOAM HANA TBL

## (undated) DEVICE — SMOKE EVACUATION PENCIL: Brand: VALLEYLAB

## (undated) DEVICE — Device: Brand: JELCO

## (undated) DEVICE — INTENDED TO SUPPORT AND MAINTAIN THE POSITION OF AN ANESTHETIZED PATIENT DURING SURGERY: Brand: LINDY TRACTION BOOT LINER

## (undated) DEVICE — SUTURE ETHBND EXCEL SZ 5 L30IN NONABSORBABLE GRN L40MM V-37 MB66G

## (undated) DEVICE — SPONGE GZ W4XL4IN COT 12 PLY TYP VII WVN C FLD DSGN STERILE

## (undated) DEVICE — PROBE BALL TIP STIMULATING 25

## (undated) DEVICE — STRYKER PERFORMANCE SERIES SAGITTAL BLADE: Brand: STRYKER PERFORMANCE SERIES

## (undated) DEVICE — SOLUTION IRRIG 1000ML 0.9% SOD CHL USP POUR PLAS BTL

## (undated) DEVICE — SUTURE VCRL SZ 1 L36IN ABSRB UD L36MM CT-1 1/2 CIR J947H

## (undated) DEVICE — SYR LR LCK 1ML GRAD NSAF 30ML --

## (undated) DEVICE — ACCY PA100-A LEGEND LUB/DIFFUSER 4 PACK: Brand: MIDAS REX®

## (undated) DEVICE — DRAIN KT WND 10FR RND 400ML --

## (undated) DEVICE — THE MILL DISPOSABLE - MEDIUM

## (undated) DEVICE — 4-PORT MANIFOLD: Brand: NEPTUNE 2

## (undated) DEVICE — SUTURE VCRL SZ 2-0 L27IN ABSRB UD L26MM CT-2 1/2 CIR J269H

## (undated) DEVICE — DRESSING FOAM W4XL10IN AG SIL ADH ANTIMIC POSTOP OPTIFOAM

## (undated) DEVICE — OPTIFOAM GENTLE SA, POSTOP, 4X10: Brand: MEDLINE

## (undated) DEVICE — 450 ML BOTTLE OF 0.05% CHLORHEXIDINE GLUCONATE IN 99.95% STERILE WATER FOR IRRIGATION, USP AND APPLICATOR.: Brand: IRRISEPT ANTIMICROBIAL WOUND LAVAGE

## (undated) DEVICE — C-ARM: Brand: UNBRANDED

## (undated) DEVICE — SYRINGE MED 30ML STD CLR PLAS LUERLOCK TIP N CTRL DISP

## (undated) DEVICE — SUTURE ABSORBABLE 3-0 PS-1 18 IN UD MONOCRYL + STRATAFIX SXMP1B102

## (undated) DEVICE — SOLUTION IRRIG 1000ML STRL H2O USP PLAS POUR BTL

## (undated) DEVICE — SUTURE STRATAFIX SPRL MCRYL + SZ 3-0 L24IN ABSRB UD PS-2 SXMP1B108

## (undated) DEVICE — PENCIL SMK EVAC ALL IN 1 DSGN ENH VISIBILITY IMPROVED AIR

## (undated) DEVICE — FLOSEAL HEMOSTATIC MATRIX, 10ML: Brand: FLOSEAL HEMOSTATIC MATRIX

## (undated) DEVICE — CATHETER IV 14GA L1.25IN TEF STR HUB INTROCAN SFTY

## (undated) DEVICE — JACKSON TABLE POSITIONER KIT: Brand: MEDLINE INDUSTRIES, INC.

## (undated) DEVICE — SUTURE VCRL + SZ 1-0 L36IN ABSRB UD CTX 1/2 CIR TAPR PNT VCP977H

## (undated) DEVICE — 3M™ TEGADERM™ TRANSPARENT FILM DRESSING FRAME STYLE, 1624W, 2-3/8 IN X 2-3/4 IN (6 CM X 7 CM), 100/CT 4CT/CASE: Brand: 3M™ TEGADERM™

## (undated) DEVICE — TOOL 14MH30 LEGEND 14CM 3MM: Brand: MIDAS REX ™

## (undated) DEVICE — COVER,MAYO STAND,STERILE: Brand: MEDLINE

## (undated) DEVICE — DRESSING FOAM 4X8IN DISP POSTOP MEPILEX BORD AG

## (undated) DEVICE — SUTURE STRATAFIX SYMMETRIC SZ 1 L18IN ABSRB VLT CT1 L36CM SXPP1A404

## (undated) DEVICE — HOOD SURG T7

## (undated) DEVICE — SUTURE STRATAFIX SPRL SZ 1 L14IN ABSRB VLT L48CM CTX 1/2 SXPD2B405

## (undated) DEVICE — SPONGE GZ W4XL4IN COT 12 PLY TYP VII WVN C FLD DSGN

## (undated) DEVICE — DERMABOND SKIN ADH 0.7ML -- DERMABOND ADVANCED 12/BX

## (undated) DEVICE — GLOVE SURG SZ 65 THK91MIL LTX FREE SYN POLYISOPRENE

## (undated) DEVICE — SUTURE MCRYL SZ 3-0 L27IN ABSRB UD L24MM PS-1 3/8 CIR PRIM Y936H

## (undated) DEVICE — HOOD, PEEL-AWAY: Brand: FLYTE

## (undated) DEVICE — GOWN,SIRUS,NONRNF,SETINSLV,XL,20/CS: Brand: MEDLINE

## (undated) DEVICE — SOLUTION SCRB 4% CHG RED ANTIMIC SKIN CLN PREOPERATIVE DISP

## (undated) DEVICE — SUTURE VCRL SZ 0 L36IN ABSRB UD L36MM CT-1 1/2 CIR J946H

## (undated) DEVICE — STRIP,CLOSURE,WOUND,MEDI-STRIP,1/2X4: Brand: MEDLINE

## (undated) DEVICE — GLOVE SURG SZ 7 L12IN FNGR THK79MIL GRN LTX FREE

## (undated) DEVICE — GLOVE ORTHO 7 1/2   MSG9475

## (undated) DEVICE — AEGIS 1" DISK 4MM HOLE, PEEL OPEN: Brand: MEDLINE

## (undated) DEVICE — COVER LT HNDL PLAS RIG 1 PER PK

## (undated) DEVICE — 3M™ TEGADERM™ TRANSPARENT FILM DRESSING FRAME STYLE, 1626, 4 IN X 4-3/4 IN (10 CM X 12 CM), 50/CT 4CT/CASE: Brand: 3M™ TEGADERM™

## (undated) DEVICE — TIP CLEANER: Brand: VALLEYLAB